# Patient Record
Sex: FEMALE | Race: WHITE | Employment: FULL TIME | URBAN - METROPOLITAN AREA
[De-identification: names, ages, dates, MRNs, and addresses within clinical notes are randomized per-mention and may not be internally consistent; named-entity substitution may affect disease eponyms.]

---

## 2017-01-12 RX ORDER — FLUOXETINE 10 MG/1
CAPSULE ORAL
Qty: 30 CAPSULE | Refills: 5 | Status: SHIPPED | OUTPATIENT
Start: 2017-01-12 | End: 2017-06-26

## 2017-01-12 NOTE — TELEPHONE ENCOUNTER
for Psychiatric Non-Scheduled (Anti-Anxiety    Refill Protocol Appointment Criteria  · Appointment scheduled in the past 6 months or in the next 3 months  Recent Visits       Provider Department Primary Dx    1 month ago MD Jonn Barboza

## 2017-06-26 RX ORDER — FLUOXETINE 10 MG/1
CAPSULE ORAL
Qty: 30 CAPSULE | Refills: 5 | Status: SHIPPED | OUTPATIENT
Start: 2017-06-26 | End: 2017-12-28

## 2017-06-26 NOTE — TELEPHONE ENCOUNTER
From: Gael Bee<BR />To: Anna Mathew MD<BR />Sent: 6/22/2017  2:18 PM CDT<BR />Subject: Medication Renewal Request<BR /><BR Adriana Swift authorizing provider: Pete Treviño MD<BR /><BR Jean Basurto would like a refill of th

## 2017-07-11 ENCOUNTER — TELEPHONE (OUTPATIENT)
Dept: INTERNAL MEDICINE CLINIC | Facility: CLINIC | Age: 52
End: 2017-07-11

## 2017-07-11 DIAGNOSIS — Z12.31 VISIT FOR SCREENING MAMMOGRAM: Primary | ICD-10-CM

## 2017-07-12 NOTE — TELEPHONE ENCOUNTER
Left message for patient to call back. Please advise her that her mammogram order is in place. She can call [phone] to make a appointment.

## 2017-08-09 ENCOUNTER — HOSPITAL ENCOUNTER (OUTPATIENT)
Dept: MAMMOGRAPHY | Facility: HOSPITAL | Age: 52
Discharge: HOME | End: 2017-08-09
Attending: INTERNAL MEDICINE
Payer: COMMERCIAL

## 2017-08-09 DIAGNOSIS — Z12.31 VISIT FOR SCREENING MAMMOGRAM: ICD-10-CM

## 2017-08-09 PROCEDURE — 77067 SCR MAMMO BI INCL CAD: CPT | Performed by: INTERNAL MEDICINE

## 2017-08-10 NOTE — PROGRESS NOTES
Mammogram results are normal. Please repeat mammogram in 1 year.  (Nurses please update health maintenance report)

## 2017-11-09 ENCOUNTER — MED REC SCAN ONLY (OUTPATIENT)
Dept: INTERNAL MEDICINE CLINIC | Facility: CLINIC | Age: 52
End: 2017-11-09

## 2017-12-28 ENCOUNTER — OFFICE VISIT (OUTPATIENT)
Dept: INTERNAL MEDICINE CLINIC | Facility: CLINIC | Age: 52
End: 2017-12-28

## 2017-12-28 VITALS
BODY MASS INDEX: 22.5 KG/M2 | RESPIRATION RATE: 16 BRPM | WEIGHT: 127 LBS | HEART RATE: 68 BPM | SYSTOLIC BLOOD PRESSURE: 121 MMHG | HEIGHT: 63 IN | DIASTOLIC BLOOD PRESSURE: 60 MMHG

## 2017-12-28 DIAGNOSIS — Z82.49 FH: CAD (CORONARY ARTERY DISEASE): ICD-10-CM

## 2017-12-28 DIAGNOSIS — F32.A DEPRESSIVE DISORDER: Primary | ICD-10-CM

## 2017-12-28 PROCEDURE — 99214 OFFICE O/P EST MOD 30 MIN: CPT | Performed by: INTERNAL MEDICINE

## 2017-12-28 PROCEDURE — 99212 OFFICE O/P EST SF 10 MIN: CPT | Performed by: INTERNAL MEDICINE

## 2017-12-28 RX ORDER — FLUOXETINE 10 MG/1
CAPSULE ORAL
Qty: 30 CAPSULE | Refills: 5 | Status: SHIPPED | OUTPATIENT
Start: 2017-12-28 | End: 2017-12-30

## 2017-12-28 RX ORDER — VALACYCLOVIR HYDROCHLORIDE 1 G/1
TABLET, FILM COATED ORAL AS NEEDED
Refills: 0 | COMMUNITY
Start: 2017-12-02 | End: 2018-11-21

## 2017-12-28 NOTE — PROGRESS NOTES
Jazmin Araujo is a 47year old female. HPI:   1. Left chest pain    Had been having some left sided chest pain that can hurt for a whole day. The pain has since receded and she is feeling better.      2. Diverticulitis    Currently not with symptoms adult)   Pulse 68   Resp 16   Ht 5' 3\" (1.6 m)   Wt 127 lb (57.6 kg)   BMI 22.50 kg/m²      GENERAL: well developed, well nourished,in no apparent distress  SKIN: no rashes,no suspicious lesions  HEENT: atraumatic, normocephalic,ears and throat are clear

## 2018-01-03 RX ORDER — FLUOXETINE 10 MG/1
CAPSULE ORAL
Qty: 30 CAPSULE | Refills: 4 | Status: SHIPPED | OUTPATIENT
Start: 2018-01-03 | End: 2018-11-21

## 2018-06-05 ENCOUNTER — OFFICE VISIT (OUTPATIENT)
Dept: INTERNAL MEDICINE CLINIC | Facility: CLINIC | Age: 53
End: 2018-06-05

## 2018-06-05 VITALS
RESPIRATION RATE: 16 BRPM | DIASTOLIC BLOOD PRESSURE: 69 MMHG | WEIGHT: 128 LBS | HEART RATE: 73 BPM | HEIGHT: 63 IN | BODY MASS INDEX: 22.68 KG/M2 | SYSTOLIC BLOOD PRESSURE: 100 MMHG

## 2018-06-05 DIAGNOSIS — R76.8 POSITIVE ANA (ANTINUCLEAR ANTIBODY): ICD-10-CM

## 2018-06-05 DIAGNOSIS — F32.A DEPRESSIVE DISORDER: Primary | ICD-10-CM

## 2018-06-05 PROCEDURE — 99212 OFFICE O/P EST SF 10 MIN: CPT | Performed by: INTERNAL MEDICINE

## 2018-06-05 PROCEDURE — 99214 OFFICE O/P EST MOD 30 MIN: CPT | Performed by: INTERNAL MEDICINE

## 2018-06-05 NOTE — PROGRESS NOTES
Lonnie Murcia is a 47year old female. HPI:     1. Depressive disorder    Patient has been taking anti-depressants as prescribed. Sleep has been worse with some menopausal symoptoms and mood is stable.  Anxiety has been better controlled on the curre throat are clear  NECK: supple,no adenopathy,no bruits  LUNGS: clear to auscultation  ABD: no tenderness noted  CARDIO: RRR without murmur  GI: good BS's,no masses, HSM, has some discomfort in LLQ without guarding or rebound.   EXTREMITIES: no cyanosis, clu

## 2018-06-25 ENCOUNTER — OFFICE VISIT (OUTPATIENT)
Dept: RHEUMATOLOGY | Facility: CLINIC | Age: 53
End: 2018-06-25

## 2018-06-25 ENCOUNTER — LAB ENCOUNTER (OUTPATIENT)
Dept: LAB | Facility: HOSPITAL | Age: 53
End: 2018-06-25
Attending: INTERNAL MEDICINE
Payer: COMMERCIAL

## 2018-06-25 VITALS
SYSTOLIC BLOOD PRESSURE: 101 MMHG | WEIGHT: 127 LBS | HEART RATE: 68 BPM | DIASTOLIC BLOOD PRESSURE: 68 MMHG | HEIGHT: 63 IN | BODY MASS INDEX: 22.5 KG/M2

## 2018-06-25 DIAGNOSIS — R76.8 POSITIVE ANA (ANTINUCLEAR ANTIBODY): ICD-10-CM

## 2018-06-25 DIAGNOSIS — R76.8 POSITIVE ANA (ANTINUCLEAR ANTIBODY): Primary | ICD-10-CM

## 2018-06-25 PROCEDURE — 86039 ANTINUCLEAR ANTIBODIES (ANA): CPT

## 2018-06-25 PROCEDURE — 36415 COLL VENOUS BLD VENIPUNCTURE: CPT

## 2018-06-25 PROCEDURE — 86160 COMPLEMENT ANTIGEN: CPT

## 2018-06-25 PROCEDURE — 86225 DNA ANTIBODY NATIVE: CPT

## 2018-06-25 PROCEDURE — 86235 NUCLEAR ANTIGEN ANTIBODY: CPT

## 2018-06-25 PROCEDURE — 99244 OFF/OP CNSLTJ NEW/EST MOD 40: CPT | Performed by: INTERNAL MEDICINE

## 2018-06-25 PROCEDURE — 99212 OFFICE O/P EST SF 10 MIN: CPT | Performed by: INTERNAL MEDICINE

## 2018-06-25 PROCEDURE — 86038 ANTINUCLEAR ANTIBODIES: CPT

## 2018-06-25 NOTE — PROGRESS NOTES
Dear Zella Kehr:    I saw your patient Laura Quigley in consultation this afternoon at your request for evaluation of positive KERVIN.   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 Occasional alcohol. Not more than 2 cups of coffee a day. She exercises a lot, which is not limited. Review of systems:  No fevers, chills, sweats, anorexia, weight loss.   Her skin is sensitive to the sun and she has broken out into a claudia replacement pellets 2-3 times a year. 3.  Gluten sensitivity, on gluten-free diet. 4.  History of diverticulitis. Thank you for inviting me to participate in her care. I will see her back when and if needed.       Sincerely,      Ashvin Slight

## 2018-09-11 ENCOUNTER — TELEPHONE (OUTPATIENT)
Dept: INTERNAL MEDICINE CLINIC | Facility: CLINIC | Age: 53
End: 2018-09-11

## 2018-09-11 DIAGNOSIS — Z12.39 SCREENING FOR BREAST CANCER: Primary | ICD-10-CM

## 2018-09-24 ENCOUNTER — TELEPHONE (OUTPATIENT)
Dept: RHEUMATOLOGY | Facility: CLINIC | Age: 53
End: 2018-09-24

## 2018-09-24 NOTE — TELEPHONE ENCOUNTER
Pt called in stating that she just saw the message from Dr. Gwen Marc and would like to try an move her appt sooner (currently for 11/5) to discuss the lab results.  She states that she will be off Oct 4th or anything after 3pm would be preferred (due to her work

## 2018-09-24 NOTE — TELEPHONE ENCOUNTER
Contacted pt to reschedule a sooner appt per request. Follow-up appt scheduled:    Future Appointments   Date Time Provider Pb De [place]   10/16/2018  4:30 PM Paulino Lechuga MD [facility] [place]

## 2018-10-06 ENCOUNTER — HOSPITAL ENCOUNTER (OUTPATIENT)
Dept: MAMMOGRAPHY | Facility: HOSPITAL | Age: 53
Discharge: HOME | End: 2018-10-06
Attending: INTERNAL MEDICINE
Payer: COMMERCIAL

## 2018-10-06 DIAGNOSIS — Z12.39 SCREENING FOR BREAST CANCER: ICD-10-CM

## 2018-10-06 PROCEDURE — 77063 BREAST TOMOSYNTHESIS BI: CPT | Performed by: INTERNAL MEDICINE

## 2018-10-06 PROCEDURE — 77067 SCR MAMMO BI INCL CAD: CPT | Performed by: INTERNAL MEDICINE

## 2018-10-16 ENCOUNTER — OFFICE VISIT (OUTPATIENT)
Dept: RHEUMATOLOGY | Facility: CLINIC | Age: 53
End: 2018-10-16
Payer: COMMERCIAL

## 2018-10-16 VITALS
BODY MASS INDEX: 23.21 KG/M2 | HEART RATE: 65 BPM | HEIGHT: 63 IN | DIASTOLIC BLOOD PRESSURE: 71 MMHG | WEIGHT: 131 LBS | SYSTOLIC BLOOD PRESSURE: 108 MMHG

## 2018-10-16 DIAGNOSIS — R76.8 POSITIVE ANA (ANTINUCLEAR ANTIBODY): Primary | ICD-10-CM

## 2018-10-16 PROCEDURE — 99213 OFFICE O/P EST LOW 20 MIN: CPT | Performed by: INTERNAL MEDICINE

## 2018-10-16 PROCEDURE — 99212 OFFICE O/P EST SF 10 MIN: CPT | Performed by: INTERNAL MEDICINE

## 2018-10-16 NOTE — PROGRESS NOTES
Dear Sherri Martinez:    I saw your patient Flor Mcgowan in follow-up this afternoon in my rheumatology clinic. I saw her in consultation on June 25th, 2018, for a positive KERVIN. She did not have good symptoms of autoimmune disorder.     Her SSA came back st May of 2018. I gave her information on Sjogren's syndrome and we had a long discussion. I will see her back in early June of 2019.      2.  History of diverticulitis. 3.  Gluten sensitivity, on gluten-free diet.       Thanks again for the opportunity

## 2018-11-19 ENCOUNTER — OFFICE VISIT (OUTPATIENT)
Dept: SURGERY | Facility: CLINIC | Age: 53
End: 2018-11-19
Payer: COMMERCIAL

## 2018-11-19 DIAGNOSIS — C44.329 SQUAMOUS CELL CARCINOMA OF SKIN OF OTHER PARTS OF FACE: Primary | ICD-10-CM

## 2018-11-19 PROCEDURE — 99212 OFFICE O/P EST SF 10 MIN: CPT | Performed by: PLASTIC SURGERY

## 2018-11-19 PROCEDURE — 99243 OFF/OP CNSLTJ NEW/EST LOW 30: CPT | Performed by: PLASTIC SURGERY

## 2018-11-19 NOTE — H&P (VIEW-ONLY)
Flor Mcgowan is a 47year old female that presents with Patient presents with:  Lesion: SCC L CHEEK  .     REFERRED BY:  Judge Bernard    Pacemaker: No  Latex Allergy: no  Coumadin: No  Plavix: No  Other anticoagulants: No  Cardiac stents: No    HAND DO Oral Cap Take 200 mg by mouth daily. Disp:  Rfl: 0   ValACYclovir HCl 1 G Oral Tab as needed.    Disp:  Rfl: 0       Allergies:     Sulfa Antibiotics       RASH  Iodine [Radiology C*    RASH  Gluten Flour            RASH    Comment:Digestive,aches and claudia Concern: Not Asked        Special Diet: Not Asked        Back Care: Not Asked        Exercise: Not Asked        Bike Helmet: Not Asked        Seat Belt: Not Asked        Self-Exams: Not Asked        Left Handed: No        Right Handed: Yes        Currently the lesion, as we need to excise margins around the tumor. Further excision may be necessary, if tumor is present microscopically on the pathology report. This may result in a longer scar. Recurrence may occur, which will require further surgery.

## 2018-11-19 NOTE — H&P
Allen Reynaga is a 47year old female that presents with Patient presents with:  Lesion: SCC L CHEEK  .     REFERRED BY:  Ronda Stock    Pacemaker: No  Latex Allergy: no  Coumadin: No  Plavix: No  Other anticoagulants: No  Cardiac stents: No    HAND DO Oral Cap Take 200 mg by mouth daily. Disp:  Rfl: 0   ValACYclovir HCl 1 G Oral Tab as needed.    Disp:  Rfl: 0       Allergies:     Sulfa Antibiotics       RASH  Iodine [Radiology C*    RASH  Gluten Flour            RASH    Comment:Digestive,aches and claudia Concern: Not Asked        Special Diet: Not Asked        Back Care: Not Asked        Exercise: Not Asked        Bike Helmet: Not Asked        Seat Belt: Not Asked        Self-Exams: Not Asked        Left Handed: No        Right Handed: Yes        Currently the lesion, as we need to excise margins around the tumor. Further excision may be necessary, if tumor is present microscopically on the pathology report. This may result in a longer scar. Recurrence may occur, which will require further surgery.

## 2018-11-20 ENCOUNTER — TELEPHONE (OUTPATIENT)
Dept: SURGERY | Facility: CLINIC | Age: 53
End: 2018-11-20

## 2018-11-20 DIAGNOSIS — C44.329 SQUAMOUS CELL CARCINOMA OF SKIN OF OTHER PARTS OF FACE: Primary | ICD-10-CM

## 2018-11-21 ENCOUNTER — OFFICE VISIT (OUTPATIENT)
Dept: INTERNAL MEDICINE CLINIC | Facility: CLINIC | Age: 53
End: 2018-11-21
Payer: COMMERCIAL

## 2018-11-21 VITALS
HEART RATE: 65 BPM | HEIGHT: 63 IN | BODY MASS INDEX: 23.04 KG/M2 | SYSTOLIC BLOOD PRESSURE: 118 MMHG | RESPIRATION RATE: 16 BRPM | WEIGHT: 130 LBS | DIASTOLIC BLOOD PRESSURE: 78 MMHG

## 2018-11-21 DIAGNOSIS — R07.9 LEFT SIDED CHEST PAIN: Primary | ICD-10-CM

## 2018-11-21 DIAGNOSIS — R05.9 COUGH: ICD-10-CM

## 2018-11-21 DIAGNOSIS — F32.A DEPRESSIVE DISORDER: ICD-10-CM

## 2018-11-21 PROCEDURE — 99214 OFFICE O/P EST MOD 30 MIN: CPT | Performed by: INTERNAL MEDICINE

## 2018-11-21 PROCEDURE — 99212 OFFICE O/P EST SF 10 MIN: CPT | Performed by: INTERNAL MEDICINE

## 2018-11-21 RX ORDER — VALACYCLOVIR HYDROCHLORIDE 1 G/1
1 TABLET, FILM COATED ORAL AS NEEDED
Qty: 21 TABLET | Refills: 1 | Status: SHIPPED | OUTPATIENT
Start: 2018-11-21 | End: 2020-08-20

## 2018-11-21 RX ORDER — FLUOXETINE 10 MG/1
10 CAPSULE ORAL DAILY
Qty: 30 CAPSULE | Refills: 11 | Status: SHIPPED | OUTPATIENT
Start: 2018-11-21 | End: 2019-11-30

## 2018-11-21 NOTE — PROGRESS NOTES
Carlito Burrell is a 47year old female. HPI:   1. Left chest pain    Had been having some left sided chest pain that can hurt for a whole day. The pain has since receded and she is feeling better.  Has had this chest pain before and had father with an exertion  GI: denies abdominal pain and denies heartburn  NEURO: denies headaches    EXAM:   /78   Pulse 65   Resp 16   Ht 5' 3\" (1.6 m)   Wt 130 lb (59 kg)   BMI 23.03 kg/m²      GENERAL: well developed, well nourished,in no apparent distress  SKIN

## 2018-11-26 NOTE — TELEPHONE ENCOUNTER
Spoke to [employer] (call ref# [id number])    Effective date: 1/1/17    Individual Deduct: $ 2,700.00 with $ 1,913.45 met  Family Deduct: $ 5,400.00 with $ 5,400.00 met  Individual OOP: $ 2,700.00 with $ 1,913.45 met  Family OOP:$ 5,400.00 with $ 5,4

## 2018-11-29 ENCOUNTER — MED REC SCAN ONLY (OUTPATIENT)
Dept: INTERNAL MEDICINE CLINIC | Facility: CLINIC | Age: 53
End: 2018-11-29

## 2018-11-30 ENCOUNTER — HOSPITAL ENCOUNTER (OUTPATIENT)
Facility: HOSPITAL | Age: 53
Setting detail: HOSPITAL OUTPATIENT SURGERY
Discharge: HOME | End: 2018-11-30
Attending: PLASTIC SURGERY | Admitting: PLASTIC SURGERY
Payer: COMMERCIAL

## 2018-11-30 ENCOUNTER — HOSPITAL DOCUMENTATION (OUTPATIENT)
Dept: SURGERY | Facility: CLINIC | Age: 53
End: 2018-11-30

## 2018-11-30 VITALS
HEART RATE: 82 BPM | SYSTOLIC BLOOD PRESSURE: 119 MMHG | OXYGEN SATURATION: 100 % | DIASTOLIC BLOOD PRESSURE: 75 MMHG | BODY MASS INDEX: 22.86 KG/M2 | HEIGHT: 63 IN | RESPIRATION RATE: 16 BRPM | WEIGHT: 129 LBS

## 2018-11-30 DIAGNOSIS — C44.329 SQUAMOUS CELL CARCINOMA OF SKIN OF OTHER PARTS OF FACE: Primary | ICD-10-CM

## 2018-11-30 PROCEDURE — 0HX1XZZ TRANSFER FACE SKIN, EXTERNAL APPROACH: ICD-10-PCS | Performed by: PLASTIC SURGERY

## 2018-11-30 PROCEDURE — 14041 TIS TRNFR F/C/C/M/N/A/G/H/F: CPT | Performed by: PLASTIC SURGERY

## 2018-11-30 PROCEDURE — 0HB1XZZ EXCISION OF FACE SKIN, EXTERNAL APPROACH: ICD-10-PCS | Performed by: PLASTIC SURGERY

## 2018-11-30 RX ORDER — ACETAMINOPHEN 500 MG
500 TABLET ORAL EVERY 4 HOURS PRN
Status: DISCONTINUED | OUTPATIENT
Start: 2018-11-30 | End: 2018-11-30

## 2018-11-30 RX ORDER — LIDOCAINE HYDROCHLORIDE AND EPINEPHRINE 5; 5 MG/ML; UG/ML
INJECTION, SOLUTION INFILTRATION; PERINEURAL AS NEEDED
Status: DISCONTINUED | OUTPATIENT
Start: 2018-11-30 | End: 2018-11-30 | Stop reason: HOSPADM

## 2018-11-30 NOTE — INTERVAL H&P NOTE
Pre-op Diagnosis: Lesion    The above referenced H&P was reviewed by Margret Acosta MD on 11/30/2018, the patient was examined and no significant changes have occurred in the patient's condition since the H&P was performed.   I discussed with the pa

## 2018-11-30 NOTE — BRIEF OP NOTE
Pre-Operative Diagnosis: Lesion     Post-Operative Diagnosis: Lesion      Procedure Performed:   Procedure(s):   Wide excision lesion(s) left cheek, frozen section    Surgeon(s) and Role:     * Praful yT MD - Primary    Assistant(s):        Cesar

## 2018-11-30 NOTE — OPERATIVE REPORT
[facility]    PATIENT'S NAME: Anushka Shauna   ATTENDING PHYSICIAN: Margaret Evans MD   OPERATING PHYSICIAN: Margaret Evans MD   PATIENT ACCOUNT#:   [de-identified]    LOCATION:  [place] [address]  MEDICAL RECORD #:   [id number] 10:10:10  Job [id number]  [facility]/    cc: MD Shanna Molina MD Lyndall Hipp.  Lonnie Jiménez MD

## 2018-12-02 ENCOUNTER — TELEPHONE (OUTPATIENT)
Dept: SURGERY | Facility: CLINIC | Age: 53
End: 2018-12-02

## 2018-12-02 NOTE — TELEPHONE ENCOUNTER
Spoke with pt. \"Little incisional pain\" Rates pain  4/10. Taking tylenol prn, helpful. [de-identified] DI with old blood present. Pt told she may shower.   Reminded next appointment 12/5/18 with RN  Instructed to call the office with any questions and/or

## 2018-12-05 ENCOUNTER — NURSE ONLY (OUTPATIENT)
Dept: SURGERY | Facility: CLINIC | Age: 53
End: 2018-12-05
Payer: COMMERCIAL

## 2018-12-05 DIAGNOSIS — C44.329 SQUAMOUS CELL CARCINOMA OF SKIN OF OTHER PARTS OF FACE: ICD-10-CM

## 2018-12-05 DIAGNOSIS — Z48.02 ENCOUNTER FOR REMOVAL OF SUTURES: Primary | ICD-10-CM

## 2018-12-05 PROCEDURE — 99212 OFFICE O/P EST SF 10 MIN: CPT | Performed by: PLASTIC SURGERY

## 2018-12-05 NOTE — PROGRESS NOTES
Surgery 1: SCC L chek  - Date: 11/30/18  - Days Since: 5    Pt here for suture removal to Left cheek . Pt identified w/2 identifiers and orders verified. Pt presents w/steri-strip C/D/I. Pt denies c/o pain, use of analgesics, and s/s infection.   Steri-s

## 2019-07-10 ENCOUNTER — OFFICE VISIT (OUTPATIENT)
Dept: INTERNAL MEDICINE CLINIC | Facility: CLINIC | Age: 54
End: 2019-07-10
Payer: COMMERCIAL

## 2019-07-10 VITALS
BODY MASS INDEX: 22.68 KG/M2 | HEART RATE: 76 BPM | RESPIRATION RATE: 16 BRPM | HEIGHT: 63 IN | SYSTOLIC BLOOD PRESSURE: 105 MMHG | DIASTOLIC BLOOD PRESSURE: 69 MMHG | WEIGHT: 128 LBS

## 2019-07-10 DIAGNOSIS — R10.32 ACUTE LEFT LOWER QUADRANT PAIN: Primary | ICD-10-CM

## 2019-07-10 PROCEDURE — 99213 OFFICE O/P EST LOW 20 MIN: CPT | Performed by: PHYSICIAN ASSISTANT

## 2019-07-10 RX ORDER — AMOXICILLIN AND CLAVULANATE POTASSIUM 875; 125 MG/1; MG/1
1 TABLET, FILM COATED ORAL 2 TIMES DAILY
Qty: 20 TABLET | Refills: 0 | Status: SHIPPED | OUTPATIENT
Start: 2019-07-10 | End: 2019-07-20

## 2019-07-10 NOTE — PROGRESS NOTES
HPI:    Patient ID: Newton Fontana is a 47year old female. HPI   Patient presents complaining of left lower quadrant pain for the past 2 weeks. States it started as being intermittent but now is constant.   She states the pain is tolerable however skin of left cheek 11/19/2018    WIde excision lesion to left cheek and frozen section     Social History    Tobacco Use      Smoking status: Never Smoker      Smokeless tobacco: Never Used    Alcohol use:  Yes      Alcohol/week: 0.0 oz      Comment: Tricia Figueroa quadrant. Lymphadenopathy:     She has no cervical adenopathy. Neurological: She is alert and oriented to person, place, and time. Skin: Skin is warm and dry. Psychiatric: She has a normal mood and affect.  Her behavior is normal. Judgment and thoug

## 2019-07-16 ENCOUNTER — TELEPHONE (OUTPATIENT)
Dept: GASTROENTEROLOGY | Facility: CLINIC | Age: 54
End: 2019-07-16

## 2019-07-16 NOTE — TELEPHONE ENCOUNTER
----- Message from Marjorie Art RN sent at 8/4/2015  4:11 PM CDT -----  Regarding: Recall Colon  Recall colon in 5 years per cb.  Colon done 8/14/14

## 2019-08-03 ENCOUNTER — HOSPITAL ENCOUNTER (OUTPATIENT)
Age: 54
Discharge: HOME | End: 2019-08-03
Attending: EMERGENCY MEDICINE
Payer: COMMERCIAL

## 2019-08-03 VITALS
TEMPERATURE: 98 F | SYSTOLIC BLOOD PRESSURE: 110 MMHG | HEART RATE: 58 BPM | WEIGHT: 125 LBS | HEIGHT: 63 IN | BODY MASS INDEX: 22.15 KG/M2 | OXYGEN SATURATION: 100 % | DIASTOLIC BLOOD PRESSURE: 75 MMHG | RESPIRATION RATE: 16 BRPM

## 2019-08-03 DIAGNOSIS — H81.10 BENIGN PAROXYSMAL POSITIONAL VERTIGO, UNSPECIFIED LATERALITY: Primary | ICD-10-CM

## 2019-08-03 PROCEDURE — 99214 OFFICE O/P EST MOD 30 MIN: CPT

## 2019-08-03 PROCEDURE — 99213 OFFICE O/P EST LOW 20 MIN: CPT

## 2019-08-03 RX ORDER — MECLIZINE HYDROCHLORIDE 25 MG/1
25 TABLET ORAL 3 TIMES DAILY PRN
Qty: 15 TABLET | Refills: 0 | Status: SHIPPED | OUTPATIENT
Start: 2019-08-03 | End: 2019-08-08

## 2019-08-03 NOTE — ED PROVIDER NOTES
Patient Seen in: [facility] CLINICS Immediate Care In [address] [place] [address]    History   Patient presents with:  Dizziness (neurologic)    Stated Complaint: Dizziness/Diarrhea/Nausea    HPI  Patient noted yesterday dizziness when she stood up after putting her dogs. reviewed and is as noted in HPI. Social History    Tobacco Use      Smoking status: Never Smoker      Smokeless tobacco: Never Used    Alcohol use:  Yes      Alcohol/week: 0.0 standard drinks      Comment: Weekends; wine, 3 glasses, weekly    Drug use: N a negative Romberg sign. Coordination and gait normal. GCS eye subscore is 4. GCS verbal subscore is 5. GCS motor subscore is 6. She displays no Babinski's sign on the right side. She displays no Babinski's sign on the left side.    Skin: Skin is warm and d

## 2019-08-03 NOTE — ED INITIAL ASSESSMENT (HPI)
Pt complaining of dizziness since yesterday. No headache or blurred vision. +nausea/-vomiting. Pt states she feels off balance. Pt denies any light sensitivity.

## 2019-09-18 ENCOUNTER — OFFICE VISIT (OUTPATIENT)
Dept: INTERNAL MEDICINE CLINIC | Facility: CLINIC | Age: 54
End: 2019-09-18
Payer: COMMERCIAL

## 2019-09-18 VITALS
WEIGHT: 125 LBS | BODY MASS INDEX: 22.15 KG/M2 | DIASTOLIC BLOOD PRESSURE: 69 MMHG | RESPIRATION RATE: 16 BRPM | HEART RATE: 58 BPM | SYSTOLIC BLOOD PRESSURE: 103 MMHG | HEIGHT: 63 IN

## 2019-09-18 DIAGNOSIS — Z00.00 ROUTINE HISTORY AND PHYSICAL EXAMINATION OF ADULT: Primary | ICD-10-CM

## 2019-09-18 DIAGNOSIS — Z12.31 VISIT FOR SCREENING MAMMOGRAM: ICD-10-CM

## 2019-09-18 PROCEDURE — 99396 PREV VISIT EST AGE 40-64: CPT | Performed by: INTERNAL MEDICINE

## 2019-09-18 RX ORDER — MECLIZINE HYDROCHLORIDE 25 MG/1
25 TABLET ORAL 3 TIMES DAILY PRN
COMMUNITY
End: 2021-05-12

## 2019-09-19 NOTE — PROGRESS NOTES
Dianna Avendaño is a 49year old female who presents for a complete physical exam. Symptoms: is menopausal. Patient complains of vertigo. There is no immunization history on file for this patient.   Wt Readings from Last 6 Encounters:  09/18/19 : 1 Infertility    • Mild depression ([facility] [address]     Anti-depressant   • Squamous cell cancer of skin of left cheek 11/19/2018    WIde excision lesion to left cheek and frozen section      Past Surgical History:   Procedure Laterality Date   • ADJ TISS XFER HEAD,FAC adult)   Pulse 58   Resp 16   Ht 5' 3\" (1.6 m)   Wt 125 lb (56.7 kg)   BMI 22.14 kg/m²   Body mass index is 22.14 kg/m².      GENERAL: well developed, well nourished,in no apparent distress  SKIN: no rashes,no suspicious lesions  HEENT: atraumatic, normoce

## 2019-11-11 ENCOUNTER — TELEPHONE (OUTPATIENT)
Dept: GASTROENTEROLOGY | Facility: CLINIC | Age: 54
End: 2019-11-11

## 2019-11-11 DIAGNOSIS — Z12.11 COLON CANCER SCREENING: Primary | ICD-10-CM

## 2019-11-11 NOTE — TELEPHONE ENCOUNTER
Pt received letter in mail for 5 yr repeat colonoscopy. 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 Can she just schedule procedure?   Please call after 3pm.

## 2019-11-12 NOTE — TELEPHONE ENCOUNTER
FINAL DIAGNOSIS                         Ileum; biopsy:     *  Fragments of small intestinal/ileal mucosa   demonstrating unremarkable features including preservation   of the villous and glandular architecture, diffuse mild   vascular congestion conscious sedation were discussed with Ms. Elianabg Rothman and her questions were answered, her informed consent was obtained. She was sedated as above. Digital rectal exam was performed which showed no masses.   The Olympus pediatric washing video colonoscope w by:  Cortney Contreras  cc: Edwar Isidro([employer])    TID: [id number]  DD: 08/14/2014 8:17:08 AM  DT: 08/15/2014 4:00:14 AM  [id number]/

## 2019-11-12 NOTE — TELEPHONE ENCOUNTER
Routed to [facility] for orders

## 2019-11-12 NOTE — TELEPHONE ENCOUNTER
Last Procedure, Date, MD:  08/14/14  Last Diagnosis:  See op report/pathology  Recalled for (months or years): 5 years  Sedation used previously:  IV  Last Prep Used (if known):    Quality of prep (if known): good  Anticoagulants:no  Diabetic Meds: no  BP

## 2019-11-13 NOTE — TELEPHONE ENCOUNTER
Scheduled for:  Colonoscopy [address]  Provider Name: Dr. Marii Liu  Date:  1/14/20  Location:  [facility]  Sedation:  MAC  Time:   0830 (pt is aware to arrive at 0730)   Prep:  Suprep, sent via [employer]/Allergies Reconciled?:  Physician reviewed   Diagnosis with c

## 2019-11-13 NOTE — TELEPHONE ENCOUNTER
Thanks all. Recent office visit with Dr. Mayda Starr of 9/18/2019 reviewed.   Previous colonoscopy report reviewed.    ====    Schedule colonoscopy exam at [facility])/ [facility]    This patient IS appropriate for the [facility]

## 2019-12-02 RX ORDER — FLUOXETINE 10 MG/1
CAPSULE ORAL
Qty: 90 CAPSULE | Refills: 1 | Status: SHIPPED | OUTPATIENT
Start: 2019-12-02 | End: 2020-06-01

## 2019-12-02 NOTE — TELEPHONE ENCOUNTER
Pt states she is taking 10mg but won't mind increasing to 25mg. Informed pt the med listed 10mg. Pt agreed to take this dose. She is out of it.      Dr Jacey Goncalves, medication pended for review/approval.

## 2019-12-02 NOTE — TELEPHONE ENCOUNTER
Patient calling upset because she needs a refill on the fluoxetine. Patient states she is taking 10 mg once daily. Please review and sign pending order.

## 2019-12-02 NOTE — TELEPHONE ENCOUNTER
Please follow up with patient to get more info on dosage:    FLUoxetine HCl 10 MG Oral Cap  11/21/18  --    Provider: Ian Hernandez MD    Take 1 capsule (10 mg total) by mouth daily.     Patient taking differently: Take 25 mg by mouth daily.

## 2019-12-03 NOTE — TELEPHONE ENCOUNTER
Spoke with [facility] and they received the rx for Fluoxetine and have filled the medication. Pharmacy will call patient to  the medication.

## 2019-12-28 ENCOUNTER — HOSPITAL ENCOUNTER (OUTPATIENT)
Dept: MAMMOGRAPHY | Facility: HOSPITAL | Age: 54
Discharge: HOME | End: 2019-12-28
Attending: INTERNAL MEDICINE
Payer: COMMERCIAL

## 2019-12-28 DIAGNOSIS — Z12.31 VISIT FOR SCREENING MAMMOGRAM: ICD-10-CM

## 2019-12-28 PROCEDURE — 77067 SCR MAMMO BI INCL CAD: CPT | Performed by: INTERNAL MEDICINE

## 2019-12-28 PROCEDURE — 77063 BREAST TOMOSYNTHESIS BI: CPT | Performed by: INTERNAL MEDICINE

## 2020-01-06 ENCOUNTER — TELEPHONE (OUTPATIENT)
Dept: GASTROENTEROLOGY | Facility: CLINIC | Age: 55
End: 2020-01-06

## 2020-01-06 NOTE — TELEPHONE ENCOUNTER
Continue off work until next visit   Pt states that RX for prep was going to be $98.  Is there something less expensive? Please call.

## 2020-01-14 ENCOUNTER — ANESTHESIA (OUTPATIENT)
Dept: ENDOSCOPY | Facility: HOSPITAL | Age: 55
End: 2020-01-14
Payer: COMMERCIAL

## 2020-01-14 ENCOUNTER — HOSPITAL ENCOUNTER (OUTPATIENT)
Facility: HOSPITAL | Age: 55
Setting detail: HOSPITAL OUTPATIENT SURGERY
Discharge: HOME | End: 2020-01-14
Attending: INTERNAL MEDICINE | Admitting: INTERNAL MEDICINE
Payer: COMMERCIAL

## 2020-01-14 ENCOUNTER — ANESTHESIA EVENT (OUTPATIENT)
Dept: ENDOSCOPY | Facility: HOSPITAL | Age: 55
End: 2020-01-14
Payer: COMMERCIAL

## 2020-01-14 VITALS
WEIGHT: 129 LBS | HEIGHT: 63 IN | DIASTOLIC BLOOD PRESSURE: 75 MMHG | BODY MASS INDEX: 22.86 KG/M2 | RESPIRATION RATE: 16 BRPM | OXYGEN SATURATION: 98 % | SYSTOLIC BLOOD PRESSURE: 105 MMHG | HEART RATE: 57 BPM

## 2020-01-14 DIAGNOSIS — Z12.11 COLON CANCER SCREENING: ICD-10-CM

## 2020-01-14 DIAGNOSIS — K64.9 HEMORRHOIDS: ICD-10-CM

## 2020-01-14 DIAGNOSIS — K57.90 DIVERTICULOSIS: Primary | ICD-10-CM

## 2020-01-14 PROCEDURE — 0DJD8ZZ INSPECTION OF LOWER INTESTINAL TRACT, VIA NATURAL OR ARTIFICIAL OPENING ENDOSCOPIC: ICD-10-PCS | Performed by: INTERNAL MEDICINE

## 2020-01-14 PROCEDURE — 45378 DIAGNOSTIC COLONOSCOPY: CPT | Performed by: INTERNAL MEDICINE

## 2020-01-14 RX ORDER — SODIUM CHLORIDE, SODIUM LACTATE, POTASSIUM CHLORIDE, CALCIUM CHLORIDE 600; 310; 30; 20 MG/100ML; MG/100ML; MG/100ML; MG/100ML
INJECTION, SOLUTION INTRAVENOUS CONTINUOUS
Status: DISCONTINUED | OUTPATIENT
Start: 2020-01-14 | End: 2020-01-14

## 2020-01-14 RX ORDER — ONDANSETRON 2 MG/ML
4 INJECTION INTRAMUSCULAR; INTRAVENOUS ONCE AS NEEDED
Status: CANCELLED | OUTPATIENT
Start: 2020-01-14 | End: 2020-01-14

## 2020-01-14 RX ORDER — NALOXONE HYDROCHLORIDE 0.4 MG/ML
80 INJECTION, SOLUTION INTRAMUSCULAR; INTRAVENOUS; SUBCUTANEOUS AS NEEDED
Status: CANCELLED | OUTPATIENT
Start: 2020-01-14 | End: 2020-01-14

## 2020-01-14 RX ORDER — SODIUM CHLORIDE, SODIUM LACTATE, POTASSIUM CHLORIDE, CALCIUM CHLORIDE 600; 310; 30; 20 MG/100ML; MG/100ML; MG/100ML; MG/100ML
INJECTION, SOLUTION INTRAVENOUS CONTINUOUS
Status: CANCELLED | OUTPATIENT
Start: 2020-01-14

## 2020-01-14 RX ORDER — LIDOCAINE HYDROCHLORIDE 10 MG/ML
INJECTION, SOLUTION EPIDURAL; INFILTRATION; INTRACAUDAL; PERINEURAL AS NEEDED
Status: DISCONTINUED | OUTPATIENT
Start: 2020-01-14 | End: 2020-01-14 | Stop reason: SURG

## 2020-01-14 RX ADMIN — LIDOCAINE HYDROCHLORIDE 50 MG: 10 INJECTION, SOLUTION EPIDURAL; INFILTRATION; INTRACAUDAL; PERINEURAL at 09:15:00

## 2020-01-14 RX ADMIN — SODIUM CHLORIDE, SODIUM LACTATE, POTASSIUM CHLORIDE, CALCIUM CHLORIDE: 600; 310; 30; 20 INJECTION, SOLUTION INTRAVENOUS at 09:45:00

## 2020-01-14 NOTE — OPERATIVE REPORT
COLONOSCOPY PROCEDURE REPORT     DATE OF PROCEDURE:  1/14/2020     PCP: Fani Solis MD     PREOPERATIVE DIAGNOSIS: Screening colonoscopy examination     POSTOPERATIVE DIAGNOSIS:  See impression. SURGEON:  Christopher A. Sylvester Doe, M.D. Mack Goodell

## 2020-01-14 NOTE — ANESTHESIA POSTPROCEDURE EVALUATION
Patient: Jessica Bains    Procedure Summary     Date:  01/14/20 Room / Location:  [facility] ENDOSCOPY    Anesthesia Start:  8726 Anesthesia Stop:  7810    Procedure:  COLONOSCOPY (N/A ) Diagnosis:       Colon cancer screening      (DIVER

## 2020-01-14 NOTE — ANESTHESIA PREPROCEDURE EVALUATION
Anesthesia PreOp Note    HPI:     Tisha Dickinson is a 49year old female who presents for preoperative consultation requested by: Staci Steel MD    Date of Surgery: 1/14/2020    Procedure(s):  COLONOSCOPY  Indication: Colon cancer screen section   •      • D & C      s/p SAB   • EXCISION LESION HEAD/NECK/FACE Left 2018    Performed by Yamileth Garcia MD at [facility] MAIN OR   • LAPAROSCOPY PROCEDURE UNLISTED      Laparoscopic Laser x 2., Dr Renetta Velasco (Endometriosis)   • LEEP Used    Substance and Sexual Activity      Alcohol use: Yes        Comment: Weekends; wine, 3 glasses, weekly      Drug use: No      Sexual activity: Yes        Partners: Male        Birth control/protection: Vasectomy    Lifestyle      Physical activity: weight is 58.5 kg (129 lb). Her blood pressure is 111/73 and her pulse is 63. Her respiration is 17 and oxygen saturation is 100%.     01/09/20  1617 01/14/20  0746   BP:  111/73   Pulse:  63   Resp:  17   SpO2:  100%   Weight: 58.5 kg (129 lb)    Height: 1

## 2020-01-14 NOTE — H&P
History & Physical Examination    Patient Name: Laura Quigley  MRN: [id number]  CSN: [id number]  YOB: 1965    Diagnosis: Screening colonoscopy examination    Present Illness: 59-year-old woman with history of anxiety, fluoxetine therapy; UNLISTED      Laparoscopic Laser x 2., Dr Vikash Collado (Endometriosis)   • LEEP       Family History   Problem Relation Age of Onset   • Heart Disorder Father         Sudden cardiac death   • Dementia Father    • Lung Disorder Father         COPD   • Anxiety Mo

## 2020-02-08 ENCOUNTER — HOSPITAL ENCOUNTER (OUTPATIENT)
Age: 55
Discharge: HOME | End: 2020-02-08
Attending: EMERGENCY MEDICINE
Payer: COMMERCIAL

## 2020-02-08 VITALS
HEART RATE: 76 BPM | BODY MASS INDEX: 22.68 KG/M2 | DIASTOLIC BLOOD PRESSURE: 71 MMHG | HEIGHT: 63 IN | RESPIRATION RATE: 18 BRPM | WEIGHT: 128 LBS | SYSTOLIC BLOOD PRESSURE: 117 MMHG | TEMPERATURE: 97 F | OXYGEN SATURATION: 100 %

## 2020-02-08 DIAGNOSIS — L02.411 CUTANEOUS ABSCESS OF RIGHT AXILLA: Primary | ICD-10-CM

## 2020-02-08 PROCEDURE — 99213 OFFICE O/P EST LOW 20 MIN: CPT

## 2020-02-08 PROCEDURE — 99214 OFFICE O/P EST MOD 30 MIN: CPT

## 2020-02-08 RX ORDER — CLINDAMYCIN HYDROCHLORIDE 300 MG/1
300 CAPSULE ORAL 3 TIMES DAILY
Qty: 30 CAPSULE | Refills: 0 | Status: SHIPPED | OUTPATIENT
Start: 2020-02-08 | End: 2020-02-18

## 2020-02-08 NOTE — ED PROVIDER NOTES
Patient Seen in: [facility] Immediate Care In [address] [place] [address]      History   Patient presents with:  Cyst    Stated Complaint: right arm cyst     HPI    The patient is a 46-year-old female with a past history of diverticulosis who presents now with area All other systems reviewed and negative except as noted above.     Physical Exam     ED Triage Vitals [02/08/20 0807]   /71   Pulse 76   Resp 18   Temp 97.3 °F (36.3 °C)   Temp src Temporal   SpO2 100 %   O2 Device None (Room air)       Current:BP diagnosis)    Disposition:  Discharge  2/8/2020  8:23 am    Follow-up:  [address] in [address]  [phone]  In 2 days  If symptoms worsen        Medications Prescribed:  Current Discharge Med

## 2020-02-08 NOTE — ED INITIAL ASSESSMENT (HPI)
Noticed boil appearing under rt arm , doesn't want to have boil lanced because leaving for [place] was wondering if po meds only ok.

## 2020-02-08 NOTE — ED NOTES
Warm cloth to area, meds as directed keep appointment Monday, return or go to the ed for new or worse concerns

## 2020-02-10 ENCOUNTER — OFFICE VISIT (OUTPATIENT)
Dept: INTERNAL MEDICINE CLINIC | Facility: CLINIC | Age: 55
End: 2020-02-10
Payer: COMMERCIAL

## 2020-02-10 VITALS
WEIGHT: 124 LBS | HEART RATE: 66 BPM | RESPIRATION RATE: 16 BRPM | DIASTOLIC BLOOD PRESSURE: 88 MMHG | HEIGHT: 63 IN | BODY MASS INDEX: 21.97 KG/M2 | SYSTOLIC BLOOD PRESSURE: 133 MMHG

## 2020-02-10 DIAGNOSIS — L08.9 INFECTED CYST OF SKIN: Primary | ICD-10-CM

## 2020-02-10 DIAGNOSIS — L72.9 INFECTED CYST OF SKIN: Primary | ICD-10-CM

## 2020-02-10 PROCEDURE — 99213 OFFICE O/P EST LOW 20 MIN: CPT | Performed by: INTERNAL MEDICINE

## 2020-02-10 NOTE — PROGRESS NOTES
Jeffery Pereyra is a 49year old female. HPI:     1. Infected cyst of skin    Has a lipoma under left arm in armpit that has been enlarged. Over weekend became swollen and red with tenderness.  Went to immediate care where hey wanted to alanis the cyst breath with exertion  CARDIOVASCULAR: denies chest pain on exertion  GI: denies abdominal pain and denies heartburn  NEURO: denies headaches    EXAM:   /88 (BP Location: Right arm, Patient Position: Sitting, Cuff Size: adult)   Pulse 66   Resp 16   H

## 2020-05-08 ENCOUNTER — TELEMEDICINE (OUTPATIENT)
Dept: INTERNAL MEDICINE CLINIC | Facility: CLINIC | Age: 55
End: 2020-05-08

## 2020-05-08 DIAGNOSIS — R10.9 INTERMITTENT ABDOMINAL PAIN: Primary | ICD-10-CM

## 2020-05-08 DIAGNOSIS — Z87.19 HX OF DIVERTICULITIS OF COLON: ICD-10-CM

## 2020-05-08 PROCEDURE — 99213 OFFICE O/P EST LOW 20 MIN: CPT | Performed by: PHYSICIAN ASSISTANT

## 2020-05-08 RX ORDER — AMOXICILLIN AND CLAVULANATE POTASSIUM 875; 125 MG/1; MG/1
1 TABLET, FILM COATED ORAL 2 TIMES DAILY
Qty: 20 TABLET | Refills: 0 | Status: SHIPPED | OUTPATIENT
Start: 2020-05-08 | End: 2020-05-18

## 2020-05-08 NOTE — PROGRESS NOTES
This is a telemedicine visit with live, interactive video and audio. Patient understands and accepts financial responsibility for any deductible, co-insurance and/or co-pays associated with this service.     SUBJECTIVE  For the past 2 weeks ago pt has b Brother 52   • Gastro-Intestinal Disorder Brother         \"GI\"      Social History    Tobacco Use      Smoking status: Never Smoker      Smokeless tobacco: Never Used    Alcohol use: Yes      Comment: Weekends; wine, 3 glasses, weekly    Drug use:  No

## 2020-06-01 RX ORDER — FLUOXETINE 10 MG/1
CAPSULE ORAL
Qty: 90 CAPSULE | Refills: 0 | Status: SHIPPED | OUTPATIENT
Start: 2020-06-01 | End: 2020-08-24

## 2020-06-05 ENCOUNTER — MED REC SCAN ONLY (OUTPATIENT)
Dept: INTERNAL MEDICINE CLINIC | Facility: CLINIC | Age: 55
End: 2020-06-05

## 2020-07-19 ENCOUNTER — HOSPITAL ENCOUNTER (EMERGENCY)
Facility: HOSPITAL | Age: 55
Discharge: HOME | End: 2020-07-19
Attending: EMERGENCY MEDICINE
Payer: COMMERCIAL

## 2020-07-19 VITALS
BODY MASS INDEX: 22.15 KG/M2 | TEMPERATURE: 98 F | HEART RATE: 75 BPM | HEIGHT: 63 IN | SYSTOLIC BLOOD PRESSURE: 122 MMHG | DIASTOLIC BLOOD PRESSURE: 71 MMHG | RESPIRATION RATE: 18 BRPM | WEIGHT: 125 LBS | OXYGEN SATURATION: 100 %

## 2020-07-19 DIAGNOSIS — Z20.822 EXPOSURE TO COVID-19 VIRUS: Primary | ICD-10-CM

## 2020-07-19 LAB — SARS-COV-2 RNA RESP QL NAA+PROBE: NOT DETECTED

## 2020-07-19 PROCEDURE — 99283 EMERGENCY DEPT VISIT LOW MDM: CPT

## 2020-07-19 NOTE — ED PROVIDER NOTES
Patient Seen in: [facility] Emergency Department      History   Patient presents with:  Testing    Stated Complaint: covid +, wants second test    HPI    The patient is a 27-year-old female who had a COVID exposure 8 days ago.   5 days ago she had All other systems reviewed and negative except as noted above.     Physical Exam     ED Triage Vitals [07/19/20 0934]   /71   Pulse 75   Resp 18   Temp 98.3 °F (36.8 °C)   Temp src Oral   SpO2 100 %   O2 Device None (Room air)       Current:

## 2020-08-20 RX ORDER — VALACYCLOVIR HYDROCHLORIDE 1 G/1
TABLET, FILM COATED ORAL
Qty: 21 TABLET | Refills: 0 | Status: SHIPPED | OUTPATIENT
Start: 2020-08-20 | End: 2021-11-10

## 2020-08-24 RX ORDER — FLUOXETINE 10 MG/1
CAPSULE ORAL
Qty: 90 CAPSULE | Refills: 0 | Status: SHIPPED | OUTPATIENT
Start: 2020-08-24 | End: 2020-11-23

## 2020-10-06 ENCOUNTER — TELEPHONE (OUTPATIENT)
Dept: INTERNAL MEDICINE CLINIC | Facility: CLINIC | Age: 55
End: 2020-10-06

## 2020-10-06 NOTE — TELEPHONE ENCOUNTER
Called patient in regards to MyChart message below    States has dry , non productive cough for one month     Changed to video visit , previous on site appt cancelled     . Patient advised that there may be a co-pay involved in this type of visit.      Briseyda Paniagua

## 2020-10-13 ENCOUNTER — TELEMEDICINE (OUTPATIENT)
Dept: INTERNAL MEDICINE CLINIC | Facility: CLINIC | Age: 55
End: 2020-10-13
Payer: COMMERCIAL

## 2020-10-13 DIAGNOSIS — R05.9 COUGH: Primary | ICD-10-CM

## 2020-10-13 PROCEDURE — 99214 OFFICE O/P EST MOD 30 MIN: CPT | Performed by: INTERNAL MEDICINE

## 2020-10-14 NOTE — PROGRESS NOTES
Patient ID: Tisha Dickinson is a 48year old female. 1. Cough    Has a chronic cough that has been bothering her the last few months.      Virtual/Telephone Check-In    Tisha Dickinson verbally consents to a Virtual/Telephone Check-In service on pellets under the skin. Hormone therapy. , Disp: , Rfl:   •  Progesterone Micronized 200 MG Oral Cap, Take 200 mg by mouth daily.   , Disp: , Rfl: 0    Allergies:  Sulfa Antibiotics       RASH  Radiology Contrast *    RASH  Gluten Flour            RASH    C Asked        Sleep Concern: Not Asked        Stress Concern: Not Asked        Weight Concern: Not Asked        Special Diet: Not Asked        Back Care: Not Asked        Exercise: Not Asked        Bike Helmet: Not Asked        Seat Belt: Not Asked        S restrictions of visitation. There are limitations of this visit as no physical exam could be performed. Every conscious effort was taken to allow for sufficient and adequate time.   This billing was spent on reviewing labs, medications, radiology tests an

## 2020-10-17 ENCOUNTER — APPOINTMENT (OUTPATIENT)
Dept: LAB | Facility: HOSPITAL | Age: 55
End: 2020-10-17
Attending: INTERNAL MEDICINE
Payer: COMMERCIAL

## 2020-10-17 ENCOUNTER — HOSPITAL ENCOUNTER (OUTPATIENT)
Dept: GENERAL RADIOLOGY | Facility: HOSPITAL | Age: 55
Discharge: HOME | End: 2020-10-17
Attending: INTERNAL MEDICINE
Payer: COMMERCIAL

## 2020-10-17 DIAGNOSIS — R05.9 COUGH: ICD-10-CM

## 2020-10-17 PROCEDURE — 86769 SARS-COV-2 COVID-19 ANTIBODY: CPT

## 2020-10-17 PROCEDURE — 71046 X-RAY EXAM CHEST 2 VIEWS: CPT | Performed by: INTERNAL MEDICINE

## 2020-10-17 PROCEDURE — 36415 COLL VENOUS BLD VENIPUNCTURE: CPT

## 2020-11-23 RX ORDER — FLUOXETINE 10 MG/1
CAPSULE ORAL
Qty: 90 CAPSULE | Refills: 0 | Status: SHIPPED | OUTPATIENT
Start: 2020-11-23 | End: 2021-03-02

## 2020-12-15 ENCOUNTER — NURSE TRIAGE (OUTPATIENT)
Dept: INTERNAL MEDICINE CLINIC | Facility: CLINIC | Age: 55
End: 2020-12-15

## 2020-12-15 NOTE — TELEPHONE ENCOUNTER
Action Requested: Summary for Provider     []  Critical Lab, Recommendations Needed  [] Need Additional Advice  []   FYI    []   Need Orders  [] Need Medications Sent to Pharmacy  []  Other     SUMMARY: OV scheduled with Dr Mayda Starr for eval chest pain.

## 2020-12-21 ENCOUNTER — OFFICE VISIT (OUTPATIENT)
Dept: INTERNAL MEDICINE CLINIC | Facility: CLINIC | Age: 55
End: 2020-12-21
Payer: COMMERCIAL

## 2020-12-21 VITALS
RESPIRATION RATE: 16 BRPM | SYSTOLIC BLOOD PRESSURE: 116 MMHG | HEART RATE: 61 BPM | WEIGHT: 127 LBS | BODY MASS INDEX: 22.5 KG/M2 | DIASTOLIC BLOOD PRESSURE: 69 MMHG | HEIGHT: 63 IN

## 2020-12-21 DIAGNOSIS — R07.2 PRECORDIAL PAIN: Primary | ICD-10-CM

## 2020-12-21 PROCEDURE — 3078F DIAST BP <80 MM HG: CPT | Performed by: INTERNAL MEDICINE

## 2020-12-21 PROCEDURE — 99213 OFFICE O/P EST LOW 20 MIN: CPT | Performed by: INTERNAL MEDICINE

## 2020-12-21 PROCEDURE — 3074F SYST BP LT 130 MM HG: CPT | Performed by: INTERNAL MEDICINE

## 2020-12-21 PROCEDURE — 3008F BODY MASS INDEX DOCD: CPT | Performed by: INTERNAL MEDICINE

## 2020-12-21 NOTE — PROGRESS NOTES
Flor Mcgowan is a 48year old female. HPI:     1. Precordial pain    The patient has been having an intermittent chest pain directly under the sternum. The pain is not related to exertion and is not associated with sweating or palpitations.   The p Location: Right arm, Patient Position: Sitting, Cuff Size: adult)   Pulse 61   Resp 16   Ht 5' 3\" (1.6 m)   Wt 127 lb (57.6 kg)   BMI 22.50 kg/m²   GENERAL: well developed, well nourished,in no apparent distress  SKIN: no rashes,no suspicious lesions  HAYDEN

## 2020-12-22 ENCOUNTER — EKG ENCOUNTER (OUTPATIENT)
Dept: LAB | Age: 55
End: 2020-12-22
Attending: INTERNAL MEDICINE
Payer: COMMERCIAL

## 2020-12-22 DIAGNOSIS — R07.2 PRECORDIAL PAIN: ICD-10-CM

## 2020-12-22 PROCEDURE — 93010 ELECTROCARDIOGRAM REPORT: CPT | Performed by: INTERNAL MEDICINE

## 2020-12-22 PROCEDURE — 93005 ELECTROCARDIOGRAM TRACING: CPT

## 2021-01-25 ENCOUNTER — TELEPHONE (OUTPATIENT)
Dept: INTERNAL MEDICINE CLINIC | Facility: CLINIC | Age: 56
End: 2021-01-25

## 2021-02-01 DIAGNOSIS — Z23 NEED FOR VACCINATION: ICD-10-CM

## 2021-02-03 ENCOUNTER — IMMUNIZATION (OUTPATIENT)
Dept: LAB | Facility: HOSPITAL | Age: 56
End: 2021-02-03
Attending: EMERGENCY MEDICINE
Payer: COMMERCIAL

## 2021-02-03 DIAGNOSIS — Z23 NEED FOR VACCINATION: Primary | ICD-10-CM

## 2021-02-03 PROCEDURE — 0011A SARSCOV2 VAC 100MCG/0.5ML IM: CPT

## 2021-03-02 RX ORDER — FLUOXETINE 10 MG/1
10 CAPSULE ORAL DAILY
Qty: 90 CAPSULE | Refills: 0 | Status: SHIPPED | OUTPATIENT
Start: 2021-03-02 | End: 2021-05-12

## 2021-03-02 NOTE — TELEPHONE ENCOUNTER
Current Outpatient Medications:   •  FLUOXETINE HCL 10 MG Oral Cap, TAKE ONE CAPSULE BY MOUTH ONE TIME DAILY , Disp: 90 capsule, Rfl: 0

## 2021-03-03 ENCOUNTER — IMMUNIZATION (OUTPATIENT)
Dept: LAB | Facility: HOSPITAL | Age: 56
End: 2021-03-03
Attending: EMERGENCY MEDICINE
Payer: COMMERCIAL

## 2021-03-03 DIAGNOSIS — Z23 NEED FOR VACCINATION: Primary | ICD-10-CM

## 2021-03-03 PROCEDURE — 0012A SARSCOV2 VAC 100MCG/0.5ML IM: CPT

## 2021-05-11 ENCOUNTER — TELEPHONE (OUTPATIENT)
Dept: INTERNAL MEDICINE CLINIC | Facility: CLINIC | Age: 56
End: 2021-05-11

## 2021-05-11 DIAGNOSIS — Z12.31 BREAST CANCER SCREENING BY MAMMOGRAM: Primary | ICD-10-CM

## 2021-05-11 NOTE — TELEPHONE ENCOUNTER
Patient is requesting an order for Mammogram.    Please call or send a message thru [address] [place] [address] [address] when approved.     [phone]

## 2021-05-12 ENCOUNTER — OFFICE VISIT (OUTPATIENT)
Dept: INTERNAL MEDICINE CLINIC | Facility: CLINIC | Age: 56
End: 2021-05-12
Payer: COMMERCIAL

## 2021-05-12 VITALS
RESPIRATION RATE: 16 BRPM | SYSTOLIC BLOOD PRESSURE: 112 MMHG | WEIGHT: 132 LBS | HEIGHT: 63 IN | HEART RATE: 71 BPM | DIASTOLIC BLOOD PRESSURE: 72 MMHG | BODY MASS INDEX: 23.39 KG/M2

## 2021-05-12 DIAGNOSIS — F32.0 CURRENT MILD EPISODE OF MAJOR DEPRESSIVE DISORDER, UNSPECIFIED WHETHER RECURRENT (HCC): ICD-10-CM

## 2021-05-12 DIAGNOSIS — R10.9 INTERMITTENT ABDOMINAL PAIN: Primary | ICD-10-CM

## 2021-05-12 PROCEDURE — 99214 OFFICE O/P EST MOD 30 MIN: CPT | Performed by: INTERNAL MEDICINE

## 2021-05-12 PROCEDURE — 3074F SYST BP LT 130 MM HG: CPT | Performed by: INTERNAL MEDICINE

## 2021-05-12 PROCEDURE — 3008F BODY MASS INDEX DOCD: CPT | Performed by: INTERNAL MEDICINE

## 2021-05-12 PROCEDURE — 3078F DIAST BP <80 MM HG: CPT | Performed by: INTERNAL MEDICINE

## 2021-05-12 RX ORDER — FLUOXETINE 10 MG/1
10 CAPSULE ORAL DAILY
Qty: 90 CAPSULE | Refills: 3 | Status: SHIPPED | OUTPATIENT
Start: 2021-05-12 | End: 2021-05-24

## 2021-05-12 NOTE — PROGRESS NOTES
Carlito Burrell is a 48year old female. HPI:     1. Intermittent abdominal pain    Has been getting intermittent abdominal pain. Still moving bowels.   Has a history of diverticulitis in the past but the pain is not been constant and is not that sever Smoker      Smokeless tobacco: Never Used    Alcohol use: Yes      Comment: Weekends; wine, 3 glasses, weekly    Drug use: No       REVIEW OF SYSTEMS:   GENERAL HEALTH: feels well otherwise  SKIN: denies any unusual skin lesions or rashes  RESPIRATORY: den patient indicates understanding of these issues and agrees to the plan. The patient is asked to return in 3 months.

## 2021-05-12 NOTE — TELEPHONE ENCOUNTER
Last bilat screening lorne mammo done on 12/28/19. See pended order      LAST RESULT  CONCLUSION:         BI-RADS CATEGORY:     DIAGNOSTIC CATEGORY 1--NEGATIVE ASSESSMENT.         RECOMMENDATIONS:   ROUTINE MAMMOGRAM AND CLINICAL EVALUATION IN 12 MONTHS.

## 2021-05-19 ENCOUNTER — TELEPHONE (OUTPATIENT)
Dept: INTERNAL MEDICINE CLINIC | Facility: CLINIC | Age: 56
End: 2021-05-19

## 2021-05-19 ENCOUNTER — NURSE TRIAGE (OUTPATIENT)
Dept: INTERNAL MEDICINE CLINIC | Facility: CLINIC | Age: 56
End: 2021-05-19

## 2021-05-19 ENCOUNTER — LAB ENCOUNTER (OUTPATIENT)
Dept: LAB | Facility: HOSPITAL | Age: 56
End: 2021-05-19
Attending: INTERNAL MEDICINE
Payer: COMMERCIAL

## 2021-05-19 DIAGNOSIS — R10.9 INTERMITTENT ABDOMINAL PAIN: ICD-10-CM

## 2021-05-19 PROCEDURE — 36415 COLL VENOUS BLD VENIPUNCTURE: CPT

## 2021-05-19 PROCEDURE — 85025 COMPLETE CBC W/AUTO DIFF WBC: CPT

## 2021-05-19 RX ORDER — LEVOFLOXACIN 500 MG/1
500 TABLET, FILM COATED ORAL DAILY
Qty: 10 TABLET | Refills: 0 | Status: SHIPPED | OUTPATIENT
Start: 2021-05-19 | End: 2021-05-19

## 2021-05-19 RX ORDER — CIPROFLOXACIN 500 MG/1
500 TABLET, FILM COATED ORAL 2 TIMES DAILY
Qty: 20 TABLET | Refills: 0 | Status: SHIPPED | OUTPATIENT
Start: 2021-05-19 | End: 2021-05-29

## 2021-05-19 RX ORDER — METRONIDAZOLE 500 MG/1
500 TABLET ORAL 3 TIMES DAILY
Qty: 30 TABLET | Refills: 0 | Status: SHIPPED | OUTPATIENT
Start: 2021-05-19 | End: 2021-08-25 | Stop reason: ALTCHOICE

## 2021-05-19 RX ORDER — METRONIDAZOLE 500 MG/1
500 TABLET ORAL 3 TIMES DAILY
Qty: 30 TABLET | Refills: 0 | Status: SHIPPED | OUTPATIENT
Start: 2021-05-19 | End: 2021-05-19

## 2021-05-19 NOTE — TELEPHONE ENCOUNTER
Action Requested: Summary for Provider     []  Critical Lab, Recommendations Needed  [] Need Additional Advice  []   FYI    []   Need Orders  [x] Need Medications Sent to Pharmacy  []  Other     SUMMARY: Patient c/o left side pain constant for the last two

## 2021-05-19 NOTE — TELEPHONE ENCOUNTER
Patient called has elevated white count will start antibiotics for presumed diverticulitis Cipro 500 twice daily for 10 days Flagyl 500 3 times daily for 10 days.   [place] was [place] of [place] [facility]

## 2021-05-19 NOTE — TELEPHONE ENCOUNTER
A prescription for Levaquin 500 mg daily for 10 days and Flagyl 500 mg 3 times daily for 10 days have been sent to [facility] for presumed divcerticulitis.  Have the patient start these medications sticking more clear liquids monitor the abdominal pain and foll

## 2021-05-19 NOTE — TELEPHONE ENCOUNTER
Spoke with the patient,verified full name and , informed her of message and instructions below patient verbalized understanding no further questions

## 2021-05-19 NOTE — TELEPHONE ENCOUNTER
Patient has diverticulitis and is requesting pain medication for pain on her side. Shes having a lot of pain and was not able to attend work today.  Call with any questions    Pharmacy    [facility] [id number] [place] [address] [address] [phone]

## 2021-05-24 ENCOUNTER — TELEPHONE (OUTPATIENT)
Dept: INTERNAL MEDICINE CLINIC | Facility: CLINIC | Age: 56
End: 2021-05-24

## 2021-05-24 RX ORDER — FLUOXETINE 10 MG/1
CAPSULE ORAL
Qty: 90 CAPSULE | Refills: 0 | Status: SHIPPED | OUTPATIENT
Start: 2021-05-24 | End: 2021-08-23

## 2021-05-24 NOTE — TELEPHONE ENCOUNTER
Patient states office visit with Dr. Amado Cisneros on 5/12 for abdominal pain and given antibiotics for diverticulitis.     Since Friday, 5/21, patient states she believes she developed CDIFF as she \"could not leave the house,\" stopped taking Cipro, but nicole

## 2021-05-25 NOTE — TELEPHONE ENCOUNTER
The patient called back and stated she feels better. She continues to feel bloated but the pain is gone. She is going to [place] an Friday and stated she will hopefully feels better.     Informed if needed to call back before going on her trip for the d

## 2021-06-10 ENCOUNTER — MED REC SCAN ONLY (OUTPATIENT)
Dept: INTERNAL MEDICINE CLINIC | Facility: CLINIC | Age: 56
End: 2021-06-10

## 2021-07-13 ENCOUNTER — HOSPITAL ENCOUNTER (OUTPATIENT)
Dept: MAMMOGRAPHY | Age: 56
Discharge: HOME | End: 2021-07-13
Attending: INTERNAL MEDICINE
Payer: COMMERCIAL

## 2021-07-13 DIAGNOSIS — Z12.31 BREAST CANCER SCREENING BY MAMMOGRAM: ICD-10-CM

## 2021-07-13 PROCEDURE — 77063 BREAST TOMOSYNTHESIS BI: CPT | Performed by: INTERNAL MEDICINE

## 2021-07-13 PROCEDURE — 77067 SCR MAMMO BI INCL CAD: CPT | Performed by: INTERNAL MEDICINE

## 2021-07-21 ENCOUNTER — HOSPITAL ENCOUNTER (OUTPATIENT)
Dept: MAMMOGRAPHY | Facility: HOSPITAL | Age: 56
Discharge: HOME | End: 2021-07-21
Attending: INTERNAL MEDICINE
Payer: COMMERCIAL

## 2021-07-21 ENCOUNTER — HOSPITAL ENCOUNTER (OUTPATIENT)
Dept: ULTRASOUND IMAGING | Facility: HOSPITAL | Age: 56
Discharge: HOME | End: 2021-07-21
Attending: INTERNAL MEDICINE
Payer: COMMERCIAL

## 2021-07-21 DIAGNOSIS — R92.8 ABNORMAL MAMMOGRAM: ICD-10-CM

## 2021-07-21 PROCEDURE — 77066 DX MAMMO INCL CAD BI: CPT | Performed by: INTERNAL MEDICINE

## 2021-07-21 PROCEDURE — 77062 BREAST TOMOSYNTHESIS BI: CPT | Performed by: INTERNAL MEDICINE

## 2021-07-21 PROCEDURE — 76642 ULTRASOUND BREAST LIMITED: CPT | Performed by: INTERNAL MEDICINE

## 2021-07-21 NOTE — IMAGING NOTE
This nurse introduced self and role of breast coordinator. Discussed recommended breast biopsy with Eben Win. Ms. Viky Astorga was recommended by Dr. Kerry Faye to have a right breast ultrasound guided biopsy.    Pt history discussed as below:  INDIC on 7/21/2021 at 11:07 AM       Finalized by (CST): Loan Gray 281, DO on 7/21/2021 at 12:07 PM       Pt history of biopsy:  No       Family history of cancer:   unknown  Pt history of breast cancer:  No  Hx BCP use:     unknown               HRT use: at the  direction of your physician. Radiology's preference is to hold these medications for 5 days prior to biopsy. Denies use. Reviewed US guided biopsy procedure, as below.   You will be lying on your back, potentially slightly toward one side, for Educated patient that this helps with comfort after the biopsy and decrease swelling. Reviewed results process with patient and shared that pathology results will be available within 2-3 business days of their biopsy.   Discussed results will be communicat

## 2021-07-22 ENCOUNTER — HOSPITAL ENCOUNTER (OUTPATIENT)
Dept: ULTRASOUND IMAGING | Facility: HOSPITAL | Age: 56
Discharge: HOME | End: 2021-07-22
Attending: INTERNAL MEDICINE
Payer: COMMERCIAL

## 2021-07-22 ENCOUNTER — HOSPITAL ENCOUNTER (OUTPATIENT)
Dept: MAMMOGRAPHY | Facility: HOSPITAL | Age: 56
Discharge: HOME | End: 2021-07-22
Attending: INTERNAL MEDICINE
Payer: COMMERCIAL

## 2021-07-22 DIAGNOSIS — N63.10 BREAST MASS, RIGHT: ICD-10-CM

## 2021-07-22 PROCEDURE — 88305 TISSUE EXAM BY PATHOLOGIST: CPT | Performed by: INTERNAL MEDICINE

## 2021-07-22 PROCEDURE — 77065 DX MAMMO INCL CAD UNI: CPT | Performed by: INTERNAL MEDICINE

## 2021-07-22 PROCEDURE — 19083 BX BREAST 1ST LESION US IMAG: CPT | Performed by: INTERNAL MEDICINE

## 2021-07-22 NOTE — PROCEDURES
[facility]  Procedure Note    Lisa Soria Patient Status:  Outpatient    9/3/1965 MRN [id number]   Location [address] Attending Archie Zuñiga MD   Hosp Day # 0 PCP Qamar Carr MD
DISPLAY PLAN FREE TEXT

## 2021-07-26 ENCOUNTER — TELEPHONE (OUTPATIENT)
Dept: INTERNAL MEDICINE CLINIC | Facility: CLINIC | Age: 56
End: 2021-07-26

## 2021-07-26 NOTE — TELEPHONE ENCOUNTER
Dr Milvia Danielle: please assist on behalf of Dr. Alan George:    Quinn Russell, breast coordinator calling to f/u on referral request.     She would like to reach out to patient. See Pathology report.  Bx of Rt breast.

## 2021-07-26 NOTE — IMAGING NOTE
Lisa Fuchs is s/p biopsy . Phoned and introduced myself as breast coordinator . Reinforced to patient  post biopsy care and instructions .     Informed  and shared the pathology results as well as the recommendations from Dr Darrion Ibarra for her breast i

## 2021-07-26 NOTE — TELEPHONE ENCOUNTER
Jazmyn Reading from Radiology calling to ask which surgeon Dr Raheel Woodard wants pt to f/u up, based on biopsy results. PATHOLOGY RESULTS ARE CONSIDERED BENIGN AND CONCORDANT. SURGICAL CONSULT IS RECOMMENDED FOR THE ATYPICAL DUCTAL HYPERPLASIA.

## 2021-08-21 NOTE — TELEPHONE ENCOUNTER
RECVD FAX FOR RX REFILL      FLUOXETINE HCL 10 MG Oral Cap, take 1 capsule by mouth daily, Disp: 90 capsule, Rfl: 0

## 2021-08-23 RX ORDER — FLUOXETINE 10 MG/1
10 CAPSULE ORAL DAILY
Qty: 90 CAPSULE | Refills: 0 | Status: SHIPPED | OUTPATIENT
Start: 2021-08-23 | End: 2021-11-10

## 2021-08-25 ENCOUNTER — OFFICE VISIT (OUTPATIENT)
Dept: SURGERY | Facility: CLINIC | Age: 56
End: 2021-08-25
Payer: COMMERCIAL

## 2021-08-25 VITALS
BODY MASS INDEX: 22.61 KG/M2 | DIASTOLIC BLOOD PRESSURE: 83 MMHG | RESPIRATION RATE: 16 BRPM | WEIGHT: 127.63 LBS | SYSTOLIC BLOOD PRESSURE: 110 MMHG | HEIGHT: 63 IN | HEART RATE: 72 BPM | OXYGEN SATURATION: 98 %

## 2021-08-25 DIAGNOSIS — D24.1 BREAST FIBROADENOMA IN FEMALE, RIGHT: ICD-10-CM

## 2021-08-25 DIAGNOSIS — N60.91 ATYPICAL DUCTAL HYPERPLASIA OF RIGHT BREAST: Primary | ICD-10-CM

## 2021-08-25 PROCEDURE — 3074F SYST BP LT 130 MM HG: CPT | Performed by: SURGERY

## 2021-08-25 PROCEDURE — 3079F DIAST BP 80-89 MM HG: CPT | Performed by: SURGERY

## 2021-08-25 PROCEDURE — 99244 OFF/OP CNSLTJ NEW/EST MOD 40: CPT | Performed by: SURGERY

## 2021-08-25 PROCEDURE — 3008F BODY MASS INDEX DOCD: CPT | Performed by: SURGERY

## 2021-08-25 NOTE — PATIENT INSTRUCTIONS
Dr. Aron Ha  Tel: [phone]  Fax: [phone] [place], [place] [address]  [phone]     Surgery/Procedure: Right breast wire localized excisional biopsy     Anesthesia:   MAC  Surgery Length:   1 surgeon's office listed above. 11. Do not take any blood thinners at least one week prior to the procedure/surgery.  This includes aspirin, baby aspirin, Ibuprofen products, herbal supplements, diet medications, vitamin E, fish oil and green tea supplement

## 2021-08-25 NOTE — PROGRESS NOTES
Breast Surgery New Patient Consultation    This is the first visit for this 48year old woman, referred by Dr. Liyah Hwang, who presents for evaluation of ADH R breast.    History of Present Illness:   Ms. Laura Quigley is a 48year old woman who p & C      s/p SAB   • LAPAROSCOPY PROCEDURE UNLISTED      Laparoscopic Laser x 2., Dr Laura Lee (Endometriosis)   • LEEP         Gynecological History:  Pt is a   Pt was 46years old at time of first pregnancy.     She has cumulative breastfeeding hist ear drainage, earaches, nasal congestion, nose bleeds, +snoring, pain in mouth/throat, hoarseness, change in voice, facial trauma.     Respiratory:  The patient denies chronic cough, phlegm, hemoptysis, pleurisy/chest pain, pneumonia, +asthma, wheezing, dif despair. Endocrine: There is no history of poor/slow wound healing, weight loss/gain, +fertility or hormone problems, cold intolerance, thyroid disease. Allergic/Immunologic:  There is no history of hives, hay fever, angioedema or anaphylaxis. The skin appears normal. There are no suspicious appearing rashes or lesions. Extremities: The extremities are without deformity, cyanosis or edema.     Impression:   Ms. Simone Jason is a 48year old woman presents with dense breast, bilateral br

## 2021-09-09 ENCOUNTER — TELEPHONE (OUTPATIENT)
Dept: SURGERY | Facility: CLINIC | Age: 56
End: 2021-09-09

## 2021-09-09 DIAGNOSIS — N60.91 ATYPICAL DUCTAL HYPERPLASIA OF RIGHT BREAST: Primary | ICD-10-CM

## 2021-09-09 NOTE — TELEPHONE ENCOUNTER
----- Message from Jabari Lagos sent at 9/9/2021  2:04 PM CDT -----  Regarding: FW: Visit Follow-up Question  Contact: Gearldean Bumpers,    Can you please follow up with pt.     Thank you,  Moose Peoples  ----- Message -----  From: Akua Rosas

## 2021-09-09 NOTE — TELEPHONE ENCOUNTER
Calling pt in regards to scheduling surgery. Apologized to pt for the delay in scheduling her surgery. Pt was understanding. Informed pt that I have 09/23/21 or 10/04/21 available at [facility] with Dr. Alee Burnett.  Pt states she would like the 09/23/2

## 2021-09-10 DIAGNOSIS — N60.91 ATYPICAL DUCTAL HYPERPLASIA OF RIGHT BREAST: Primary | ICD-10-CM

## 2021-09-21 ENCOUNTER — LAB ENCOUNTER (OUTPATIENT)
Dept: LAB | Facility: HOSPITAL | Age: 56
End: 2021-09-21
Attending: SURGERY
Payer: COMMERCIAL

## 2021-09-21 DIAGNOSIS — Z01.818 PREOP TESTING: ICD-10-CM

## 2021-09-22 LAB — SARS-COV-2 RNA RESP QL NAA+PROBE: NOT DETECTED

## 2021-09-23 ENCOUNTER — HOSPITAL ENCOUNTER (OUTPATIENT)
Facility: HOSPITAL | Age: 56
Setting detail: HOSPITAL OUTPATIENT SURGERY
Discharge: HOME | End: 2021-09-23
Attending: SURGERY | Admitting: SURGERY
Payer: COMMERCIAL

## 2021-09-23 ENCOUNTER — ANESTHESIA (OUTPATIENT)
Dept: SURGERY | Facility: HOSPITAL | Age: 56
End: 2021-09-23
Payer: COMMERCIAL

## 2021-09-23 ENCOUNTER — HOSPITAL ENCOUNTER (OUTPATIENT)
Dept: MAMMOGRAPHY | Facility: HOSPITAL | Age: 56
Discharge: HOME | End: 2021-09-23
Attending: SURGERY
Payer: COMMERCIAL

## 2021-09-23 ENCOUNTER — HOSPITAL ENCOUNTER (OUTPATIENT)
Dept: ULTRASOUND IMAGING | Facility: HOSPITAL | Age: 56
Discharge: HOME | End: 2021-09-23
Attending: SURGERY
Payer: COMMERCIAL

## 2021-09-23 ENCOUNTER — APPOINTMENT (OUTPATIENT)
Dept: MAMMOGRAPHY | Facility: HOSPITAL | Age: 56
End: 2021-09-23
Attending: SURGERY
Payer: COMMERCIAL

## 2021-09-23 ENCOUNTER — ANESTHESIA EVENT (OUTPATIENT)
Dept: SURGERY | Facility: HOSPITAL | Age: 56
End: 2021-09-23
Payer: COMMERCIAL

## 2021-09-23 VITALS
HEIGHT: 63 IN | RESPIRATION RATE: 18 BRPM | HEART RATE: 59 BPM | WEIGHT: 128 LBS | BODY MASS INDEX: 22.68 KG/M2 | DIASTOLIC BLOOD PRESSURE: 59 MMHG | TEMPERATURE: 98 F | SYSTOLIC BLOOD PRESSURE: 107 MMHG | OXYGEN SATURATION: 99 %

## 2021-09-23 DIAGNOSIS — Z01.818 PREOP TESTING: Primary | ICD-10-CM

## 2021-09-23 DIAGNOSIS — N60.91 ATYPICAL DUCTAL HYPERPLASIA OF RIGHT BREAST: ICD-10-CM

## 2021-09-23 PROCEDURE — 88300 SURGICAL PATH GROSS: CPT | Performed by: SURGERY

## 2021-09-23 PROCEDURE — 76098 X-RAY EXAM SURGICAL SPECIMEN: CPT | Performed by: SURGERY

## 2021-09-23 PROCEDURE — A4648 IMPLANTABLE TISSUE MARKER: HCPCS

## 2021-09-23 PROCEDURE — 19285 PERQ DEV BREAST 1ST US IMAG: CPT | Performed by: SURGERY

## 2021-09-23 PROCEDURE — 0HBT0ZZ EXCISION OF RIGHT BREAST, OPEN APPROACH: ICD-10-PCS | Performed by: SURGERY

## 2021-09-23 PROCEDURE — 88307 TISSUE EXAM BY PATHOLOGIST: CPT | Performed by: SURGERY

## 2021-09-23 PROCEDURE — 77065 DX MAMMO INCL CAD UNI: CPT | Performed by: SURGERY

## 2021-09-23 PROCEDURE — 88360 TUMOR IMMUNOHISTOCHEM/MANUAL: CPT | Performed by: SURGERY

## 2021-09-23 RX ORDER — MORPHINE SULFATE 4 MG/ML
4 INJECTION, SOLUTION INTRAMUSCULAR; INTRAVENOUS EVERY 10 MIN PRN
Status: DISCONTINUED | OUTPATIENT
Start: 2021-09-23 | End: 2021-09-23

## 2021-09-23 RX ORDER — HYDROCODONE BITARTRATE AND ACETAMINOPHEN 5; 325 MG/1; MG/1
1-2 TABLET ORAL EVERY 6 HOURS PRN
Qty: 20 TABLET | Refills: 0 | Status: SHIPPED | OUTPATIENT
Start: 2021-09-23 | End: 2021-09-29 | Stop reason: ALTCHOICE

## 2021-09-23 RX ORDER — ACETAMINOPHEN 500 MG
1000 TABLET ORAL ONCE
Status: COMPLETED | OUTPATIENT
Start: 2021-09-23 | End: 2021-09-23

## 2021-09-23 RX ORDER — HYDROMORPHONE HYDROCHLORIDE 1 MG/ML
0.2 INJECTION, SOLUTION INTRAMUSCULAR; INTRAVENOUS; SUBCUTANEOUS EVERY 5 MIN PRN
Status: DISCONTINUED | OUTPATIENT
Start: 2021-09-23 | End: 2021-09-23

## 2021-09-23 RX ORDER — BUPIVACAINE HYDROCHLORIDE 5 MG/ML
INJECTION, SOLUTION EPIDURAL; INTRACAUDAL AS NEEDED
Status: DISCONTINUED | OUTPATIENT
Start: 2021-09-23 | End: 2021-09-23

## 2021-09-23 RX ORDER — ONDANSETRON 2 MG/ML
4 INJECTION INTRAMUSCULAR; INTRAVENOUS ONCE AS NEEDED
Status: DISCONTINUED | OUTPATIENT
Start: 2021-09-23 | End: 2021-09-23

## 2021-09-23 RX ORDER — HYDROMORPHONE HYDROCHLORIDE 1 MG/ML
0.6 INJECTION, SOLUTION INTRAMUSCULAR; INTRAVENOUS; SUBCUTANEOUS EVERY 5 MIN PRN
Status: DISCONTINUED | OUTPATIENT
Start: 2021-09-23 | End: 2021-09-23

## 2021-09-23 RX ORDER — MIDAZOLAM HYDROCHLORIDE 1 MG/ML
INJECTION INTRAMUSCULAR; INTRAVENOUS AS NEEDED
Status: DISCONTINUED | OUTPATIENT
Start: 2021-09-23 | End: 2021-09-23 | Stop reason: SURG

## 2021-09-23 RX ORDER — HYDROMORPHONE HYDROCHLORIDE 1 MG/ML
0.4 INJECTION, SOLUTION INTRAMUSCULAR; INTRAVENOUS; SUBCUTANEOUS EVERY 5 MIN PRN
Status: DISCONTINUED | OUTPATIENT
Start: 2021-09-23 | End: 2021-09-23

## 2021-09-23 RX ORDER — HYDROCODONE BITARTRATE AND ACETAMINOPHEN 5; 325 MG/1; MG/1
1 TABLET ORAL AS NEEDED
Status: DISCONTINUED | OUTPATIENT
Start: 2021-09-23 | End: 2021-09-23

## 2021-09-23 RX ORDER — NALOXONE HYDROCHLORIDE 0.4 MG/ML
80 INJECTION, SOLUTION INTRAMUSCULAR; INTRAVENOUS; SUBCUTANEOUS AS NEEDED
Status: DISCONTINUED | OUTPATIENT
Start: 2021-09-23 | End: 2021-09-23

## 2021-09-23 RX ORDER — HYDROCODONE BITARTRATE AND ACETAMINOPHEN 5; 325 MG/1; MG/1
2 TABLET ORAL AS NEEDED
Status: DISCONTINUED | OUTPATIENT
Start: 2021-09-23 | End: 2021-09-23

## 2021-09-23 RX ORDER — PROCHLORPERAZINE EDISYLATE 5 MG/ML
5 INJECTION INTRAMUSCULAR; INTRAVENOUS ONCE AS NEEDED
Status: DISCONTINUED | OUTPATIENT
Start: 2021-09-23 | End: 2021-09-23

## 2021-09-23 RX ORDER — MORPHINE SULFATE 10 MG/ML
6 INJECTION, SOLUTION INTRAMUSCULAR; INTRAVENOUS EVERY 10 MIN PRN
Status: DISCONTINUED | OUTPATIENT
Start: 2021-09-23 | End: 2021-09-23

## 2021-09-23 RX ORDER — HALOPERIDOL 5 MG/ML
0.25 INJECTION INTRAMUSCULAR ONCE AS NEEDED
Status: DISCONTINUED | OUTPATIENT
Start: 2021-09-23 | End: 2021-09-23

## 2021-09-23 RX ORDER — MORPHINE SULFATE 4 MG/ML
2 INJECTION, SOLUTION INTRAMUSCULAR; INTRAVENOUS EVERY 10 MIN PRN
Status: DISCONTINUED | OUTPATIENT
Start: 2021-09-23 | End: 2021-09-23

## 2021-09-23 RX ORDER — LIDOCAINE HYDROCHLORIDE AND EPINEPHRINE 10; 10 MG/ML; UG/ML
INJECTION, SOLUTION INFILTRATION; PERINEURAL AS NEEDED
Status: DISCONTINUED | OUTPATIENT
Start: 2021-09-23 | End: 2021-09-23

## 2021-09-23 RX ORDER — SODIUM CHLORIDE, SODIUM LACTATE, POTASSIUM CHLORIDE, CALCIUM CHLORIDE 600; 310; 30; 20 MG/100ML; MG/100ML; MG/100ML; MG/100ML
INJECTION, SOLUTION INTRAVENOUS CONTINUOUS
Status: DISCONTINUED | OUTPATIENT
Start: 2021-09-23 | End: 2021-09-23

## 2021-09-23 RX ORDER — CEFAZOLIN SODIUM/WATER 2 G/20 ML
2 SYRINGE (ML) INTRAVENOUS ONCE
Status: DISCONTINUED | OUTPATIENT
Start: 2021-09-23 | End: 2021-09-23 | Stop reason: HOSPADM

## 2021-09-23 RX ADMIN — SODIUM CHLORIDE, SODIUM LACTATE, POTASSIUM CHLORIDE, CALCIUM CHLORIDE: 600; 310; 30; 20 INJECTION, SOLUTION INTRAVENOUS at 12:19:00

## 2021-09-23 RX ADMIN — MIDAZOLAM HYDROCHLORIDE 2 MG: 1 INJECTION INTRAMUSCULAR; INTRAVENOUS at 11:36:00

## 2021-09-23 RX ADMIN — SODIUM CHLORIDE, SODIUM LACTATE, POTASSIUM CHLORIDE, CALCIUM CHLORIDE: 600; 310; 30; 20 INJECTION, SOLUTION INTRAVENOUS at 11:40:00

## 2021-09-23 NOTE — ANESTHESIA PREPROCEDURE EVALUATION
Anesthesia PreOp Note    HPI:     Tisha Dickinson is a 55year old female who presents for preoperative consultation requested by: Randi Smith MD    Date of Surgery: 9/23/2021    Procedure(s):  right breast wire localized excisional biopsy.  Radi Nortryptaline   • Infertility    • Mild depression (HCC)     Anti-depressant   • PONV (postoperative nausea and vomiting)    • Squamous cell cancer of skin of left cheek 11/19/2018    WIde excision lesion to left cheek and frozen section   • Visual impairm • Other (Throat Cancer) Paternal Uncle 72     Social History    Socioeconomic History      Marital status:       Spouse name: Not on file      Number of children: Not on file      Years of education: Not on file      Highest education level: Not o Activity:       Days of Exercise per Week: Not on file      Minutes of Exercise per Session: Not on file  Stress:       Feeling of Stress : Not on file  Social Connections:       Frequency of Communication with Friends and Family: Not on file      Frequenc Abdominal  - normal exam             Anesthesia Plan:   ASA:  1  Plan:   General  Informed Consent Plan and Risks Discussed With:  Patient      I have informed Denverdannie Carcamoers and/or legal guardian or family member of the nature of the anesthetic plan,

## 2021-09-23 NOTE — BRIEF OP NOTE
Pre-Operative Diagnosis: Atypical ductal hyperplasia of right breast [N60.91]     Post-Operative Diagnosis: Atypical ductal hyperplasia of right breast [N60.91]      Procedure Performed:   right breast wire localized excisional biopsy.  Radiology Instructio

## 2021-09-23 NOTE — ANESTHESIA POSTPROCEDURE EVALUATION
Patient: Adenike Cifuentes    Procedure Summary     Date: 09/23/21 Room / Location: [address] [place] [address] OR 08 / [address] [place] [address] OR    Anesthesia Start: 9783 Anesthesia Stop: 9049    Procedure: right breast wire localized excisional biopsy.  Radiology Instructions: right br

## 2021-09-23 NOTE — PROCEDURES
[facility]  Procedure Note    Jeffery Pereyra Patient Status:  Outpatient    9/3/1965 MRN [id number]   Location [place] [address] Attending Jayson Courtney MD   Hosp Day # 0 PCP No primary care provider on file.      Pro

## 2021-09-23 NOTE — H&P
History of Present Illness:   Ms. Isabel Ramos is a 48year old woman who presents with imaging detected concern of the right breast.  The patient denies any palpable masses, nipple discharge, skin changes or axillary symptoms.   She does not have a       Gynecological History:  Pt is a   Pt was 46years old at time of first pregnancy. She has cumulative breastfeeding history of 12 months. She achieved menarche at age 16. Age of Menopause: 51  Type: natural menopause.   She has history of ho hoarseness, change in voice, facial trauma.     Respiratory:  The patient denies chronic cough, phlegm, hemoptysis, pleurisy/chest pain, pneumonia, +asthma, wheezing, difficulty in breathing with exertion, emphysema, chronic bronchitis, shortness of breath weight loss/gain, +fertility or hormone problems, cold intolerance, thyroid disease.      Allergic/Immunologic:  There is no history of hives, hay fever, angioedema or anaphylaxis.     /83 (BP Location: Left arm, Patient Position: Sitting, Cuff Size: rashes or lesions.     Extremities:  The extremities are without deformity, cyanosis or edema.     Impression:   Ms. Jessica Bains is a 48year old woman presents with dense breast, bilateral breast cysts, biopsy confirmed right breast fibroadenoma wi the procedure, including risks, benefits and side effects related to the alternatives and risks related to not receiving this procedure. We will proceed with procedure as planned.

## 2021-09-23 NOTE — IMAGING NOTE
3936 Pt  to ultrasound department scouts completed by Reinier Contreras us tech      0730 Hx taken procedure explained questions answered.       1147  Consent signed and verified prior to [address] arrival.      Stephon Lamb here scanning completed Order verified and s

## 2021-09-24 NOTE — OPERATIVE REPORT
[facility]    PATIENT'S NAME: Edmundo Huynh   ATTENDING PHYSICIAN: Jeannette Melissa MD   OPERATING PHYSICIAN: Jeannette Nichols.  Ratna Melissa MD   PATIENT ACCOUNT#:   [id number]    LOCATION:  [facility] 10  MEDICAL RECORD #:   [id number] blade knife in the skin. The wire was identified and brought into the field.   Using sharp dissection and electrocautery, a segment of breast tissue surrounding the tip of the wire was excised, carefully oriented with a short stitch, single clip superiorly

## 2021-09-27 ENCOUNTER — TELEPHONE (OUTPATIENT)
Dept: SURGERY | Facility: CLINIC | Age: 56
End: 2021-09-27

## 2021-09-27 NOTE — TELEPHONE ENCOUNTER
I spoke with the patient regarding her pathology upgrade to DCIS. Explained to the patient that her margins appear negative but close and that reexcision and/or further treatment with radiation and her medications may be recommended in light of this.   I cedric

## 2021-09-29 ENCOUNTER — OFFICE VISIT (OUTPATIENT)
Dept: SURGERY | Facility: CLINIC | Age: 56
End: 2021-09-29
Payer: COMMERCIAL

## 2021-09-29 VITALS
RESPIRATION RATE: 18 BRPM | HEART RATE: 62 BPM | DIASTOLIC BLOOD PRESSURE: 83 MMHG | TEMPERATURE: 98 F | SYSTOLIC BLOOD PRESSURE: 130 MMHG | HEIGHT: 63 IN | WEIGHT: 128 LBS | OXYGEN SATURATION: 98 % | BODY MASS INDEX: 22.68 KG/M2

## 2021-09-29 DIAGNOSIS — D05.11 BREAST NEOPLASM, TIS (DCIS), RIGHT: Primary | ICD-10-CM

## 2021-09-29 PROCEDURE — 3008F BODY MASS INDEX DOCD: CPT | Performed by: SURGERY

## 2021-09-29 PROCEDURE — 3075F SYST BP GE 130 - 139MM HG: CPT | Performed by: SURGERY

## 2021-09-29 PROCEDURE — 99024 POSTOP FOLLOW-UP VISIT: CPT | Performed by: SURGERY

## 2021-09-29 PROCEDURE — 3079F DIAST BP 80-89 MM HG: CPT | Performed by: SURGERY

## 2021-09-29 RX ORDER — PROGESTERONE 200 MG/1
200 CAPSULE ORAL NIGHTLY
COMMUNITY

## 2021-09-29 NOTE — PATIENT INSTRUCTIONS
Dr. Sven Page  Tel: [phone]  Fax: [phone]  [address] [facility] [address] [place] [address]  [phone]     Surgery/Procedure: Bilateral nipple sparing mastectomy, right lymphoscintigraphy, right sentinel lymph node and directions on where to go. They begin making calls after 2pm, if you are not contacted by 4pm, please call the surgeon's office listed above. 11. Do not take any blood thinners at least one week prior to the procedure/surgery.  This includes aspirin, b

## 2021-09-30 ENCOUNTER — NURSE NAVIGATOR ENCOUNTER (OUTPATIENT)
Dept: HEMATOLOGY/ONCOLOGY | Facility: HOSPITAL | Age: 56
End: 2021-09-30

## 2021-09-30 DIAGNOSIS — D05.11 BREAST NEOPLASM, TIS (DCIS), RIGHT: Primary | ICD-10-CM

## 2021-09-30 NOTE — PROGRESS NOTES
Patient presents to the [facility] for post surgical follow up with Dr. Alee Burnett. Surgical pathology from her excisional biopsy indicates right breast DCIS. Dr. Alee Burnett has dicussed with patient need to proceed with mastectomy.   Patient wishes to proceed

## 2021-10-01 ENCOUNTER — OFFICE VISIT (OUTPATIENT)
Dept: SURGERY | Facility: CLINIC | Age: 56
End: 2021-10-01
Payer: COMMERCIAL

## 2021-10-01 VITALS
OXYGEN SATURATION: 98 % | WEIGHT: 127 LBS | BODY MASS INDEX: 22.5 KG/M2 | DIASTOLIC BLOOD PRESSURE: 83 MMHG | HEART RATE: 65 BPM | SYSTOLIC BLOOD PRESSURE: 126 MMHG | RESPIRATION RATE: 16 BRPM | HEIGHT: 63 IN

## 2021-10-01 DIAGNOSIS — D05.11 DUCTAL CARCINOMA IN SITU (DCIS) OF RIGHT BREAST: Primary | ICD-10-CM

## 2021-10-01 PROCEDURE — 3008F BODY MASS INDEX DOCD: CPT | Performed by: SURGERY

## 2021-10-01 PROCEDURE — 3079F DIAST BP 80-89 MM HG: CPT | Performed by: SURGERY

## 2021-10-01 PROCEDURE — 3074F SYST BP LT 130 MM HG: CPT | Performed by: SURGERY

## 2021-10-01 PROCEDURE — 99243 OFF/OP CNSLTJ NEW/EST LOW 30: CPT | Performed by: SURGERY

## 2021-10-01 NOTE — CONSULTS
New Patient Consultation    This is the first visit for this 55year old female who presents to discuss reconstructive options following surgery for breast cancer. History of Present Illness:    The patient is a 55year old female who presents with a r 0  VALACYCLOVIR HCL 1 G Oral Tab, TAKE ONE TABLET BY MOUTH AS NEEDED , Disp: 21 tablet, Rfl: 0          Allergies:      Sulfa Antibiotics       RASH  Radiology Contrast *    RASH  Gluten Flour            RASH    Comment:Digestive,aches and rash      Family diarrhea, constipation, change in bowel habits, or abdominal pain.+ Diverticulitis requiring hospitalization for IV antibiotics x2    Genitourinary:  The patient denies frequent urination, needing to get up at night to urinate, urinary hesitancy or retaini breast: Grade 1 ptosis is noted. Striae are absent. A well-healed incision is noted at the lateral areolar border. Measurements: sternal notch to nipple 22.5cm, nipple to inframammary fold 7.5cm, base diameter 14cm.   The skin, nipple, and areola appear n expansion process, as well as the need for a secondary procedure for placement of a permanent implant, were reviewed. Representative illustrations and photographs were demonstrated to the patient.  The risks of surgery including but not limited to bleeding

## 2021-10-04 ENCOUNTER — TELEPHONE (OUTPATIENT)
Dept: SURGERY | Facility: CLINIC | Age: 56
End: 2021-10-04

## 2021-10-05 DIAGNOSIS — D05.11 DUCTAL CARCINOMA IN SITU (DCIS) OF RIGHT BREAST: Primary | ICD-10-CM

## 2021-10-05 NOTE — TELEPHONE ENCOUNTER
Pt called back in regards to scheduling surgery. 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 Informed pt that if she is willing to go to [facility] I have 11/18/21 available.  Pt verbalized understanding

## 2021-10-05 NOTE — TELEPHONE ENCOUNTER
Calling pt in regards to scheduling surgery with Dr. Lenin Oliva and Dr. Anatoly Lujan. LVM requesting call back. Contact info given.

## 2021-10-11 NOTE — PROGRESS NOTES
Breast Surgery Post-Operative Visit    Diagnosis: Right breast atypical ductal hyperplasia upgraded to DCIS on lumpectomy September 23, 2021. Stage: TisNxMx    Disease Status:  Surgical treatment incomplete, multiple positive margins management pending. lesion to left cheek and frozen section   • Visual impairment     readers       Past Surgical History:   Procedure Laterality Date   • ADJ TISS XFER HEAD,FAC,HAND 10.1-30 Left 11/30/2018    WIde excision lesion to left cheek and frozen section   • C-SECTIO Paternal Uncle 72       She is not of Ashkenazi Hindu ancestry. Social History:    Alcohol use Yes   Comment: Weekends; wine, 3 glasses, weekly       Smoking status: Never Smoker   Smokeless tobacco: Never Used     Ms. Alejandrina Orellana is  wi skin lesions, dry skin, change in skin color or change in moles. Hematologic/Lymphatic:  The patient denies easily bruising or bleeding or persistent swollen glands or lymph nodes.      Musculoskeletal:  The patient denies muscle aches/pain, joint pain, pectoralis. There is no nipple retraction. No nipple discharge can be elicited. The parenchyma is mildly nodular.  There are no dominant masses in the breast. The axillary tail is normal.  There is a well-healed incision on the breast with no signs of eryth breast conservation and mastectomy (with or without reconstruction), including the fact that survival rates are essentially equal with these two approaches.  If breast conservation is elected, I explained the need for free margins, the  possibility of re-ex

## 2021-10-21 ENCOUNTER — TELEPHONE (OUTPATIENT)
Dept: SURGERY | Facility: CLINIC | Age: 56
End: 2021-10-21

## 2021-10-21 NOTE — TELEPHONE ENCOUNTER
Returned pt phone call regarding the surgical plan and how her 2nd opinion appt went with Dr. Khadra Parker.   Pt states that Dr. Khadra Parker agreed with the surgical plan, but did say he wanted to do an MRI and that he doesn't think a nipple sparing mastectomy wou

## 2021-10-22 ENCOUNTER — TELEPHONE (OUTPATIENT)
Dept: SURGERY | Facility: CLINIC | Age: 56
End: 2021-10-22

## 2021-10-22 ENCOUNTER — TELEPHONE (OUTPATIENT)
Dept: RHEUMATOLOGY | Facility: CLINIC | Age: 56
End: 2021-10-22

## 2021-10-22 ENCOUNTER — TELEPHONE (OUTPATIENT)
Dept: INTERNAL MEDICINE CLINIC | Facility: CLINIC | Age: 56
End: 2021-10-22

## 2021-10-22 NOTE — TELEPHONE ENCOUNTER
Former patient of Dr. Thee Cool requesting to establish care with Dr. Maricarmen Smith, patient was recently diagnosed with breast cancer and will be having surgery 11/18. Patient would like to come in to see Dr. Hiral Tapia before her surgery but cant miss work.

## 2021-10-22 NOTE — TELEPHONE ENCOUNTER
Attempted to call pt back regarding her questions after having her second opinion. No answer. LVM after verifying verbal release.   Informed pt that per Dr. Denice Nick, \"I never recommend MRI post-lumpectomy as it will not change the management regarding a ma

## 2021-10-26 NOTE — TELEPHONE ENCOUNTER
Pt made an appt to see Dr. Saman Peguero on 11-10-21 at 5:00 used res 24 ok 'd per the doctors message below. Pt states that this will be for a pre op clearance.

## 2021-11-04 ENCOUNTER — TELEPHONE (OUTPATIENT)
Dept: GENERAL RADIOLOGY | Facility: HOSPITAL | Age: 56
End: 2021-11-04

## 2021-11-09 ENCOUNTER — OFFICE VISIT (OUTPATIENT)
Dept: PHYSICAL THERAPY | Facility: HOSPITAL | Age: 56
End: 2021-11-09
Attending: SURGERY
Payer: COMMERCIAL

## 2021-11-09 DIAGNOSIS — D05.11 BREAST NEOPLASM, TIS (DCIS), RIGHT: ICD-10-CM

## 2021-11-10 ENCOUNTER — OFFICE VISIT (OUTPATIENT)
Dept: INTERNAL MEDICINE CLINIC | Facility: CLINIC | Age: 56
End: 2021-11-10
Payer: COMMERCIAL

## 2021-11-10 VITALS
HEART RATE: 64 BPM | HEIGHT: 63 IN | SYSTOLIC BLOOD PRESSURE: 105 MMHG | DIASTOLIC BLOOD PRESSURE: 70 MMHG | BODY MASS INDEX: 22.68 KG/M2 | WEIGHT: 128 LBS

## 2021-11-10 DIAGNOSIS — Z01.818 PREOP EXAM FOR INTERNAL MEDICINE: Primary | ICD-10-CM

## 2021-11-10 DIAGNOSIS — D05.11 DUCTAL CARCINOMA IN SITU (DCIS) OF RIGHT BREAST: ICD-10-CM

## 2021-11-10 DIAGNOSIS — D05.11 DUCTAL CARCINOMA IN SITU (DCIS) OF RIGHT BREAST: Primary | ICD-10-CM

## 2021-11-10 PROBLEM — D05.10 DCIS (DUCTAL CARCINOMA IN SITU): Status: ACTIVE | Noted: 2021-09-30

## 2021-11-10 PROBLEM — R05.9 COUGH: Status: RESOLVED | Noted: 2018-11-21 | Resolved: 2021-11-10

## 2021-11-10 PROBLEM — L08.9 INFECTED CYST OF SKIN: Status: RESOLVED | Noted: 2020-02-10 | Resolved: 2021-11-10

## 2021-11-10 PROBLEM — R07.9 LEFT SIDED CHEST PAIN: Status: RESOLVED | Noted: 2018-11-21 | Resolved: 2021-11-10

## 2021-11-10 PROBLEM — L72.9 INFECTED CYST OF SKIN: Status: RESOLVED | Noted: 2020-02-10 | Resolved: 2021-11-10

## 2021-11-10 PROCEDURE — 3074F SYST BP LT 130 MM HG: CPT | Performed by: INTERNAL MEDICINE

## 2021-11-10 PROCEDURE — 3008F BODY MASS INDEX DOCD: CPT | Performed by: INTERNAL MEDICINE

## 2021-11-10 PROCEDURE — 3078F DIAST BP <80 MM HG: CPT | Performed by: INTERNAL MEDICINE

## 2021-11-10 PROCEDURE — 99213 OFFICE O/P EST LOW 20 MIN: CPT | Performed by: INTERNAL MEDICINE

## 2021-11-10 RX ORDER — VALACYCLOVIR HYDROCHLORIDE 1 G/1
1000 TABLET, FILM COATED ORAL AS NEEDED
Qty: 21 TABLET | Refills: 0 | Status: SHIPPED | OUTPATIENT
Start: 2021-11-10

## 2021-11-10 RX ORDER — FLUOXETINE 10 MG/1
10 CAPSULE ORAL DAILY
Qty: 90 CAPSULE | Refills: 0 | Status: SHIPPED | OUTPATIENT
Start: 2021-11-10

## 2021-11-10 NOTE — PROGRESS NOTES
Homer Lee is a 55year old female who presents for a pre-operative physical exam. Patient is to have b/l mastectomy , to be done by Dr. Chitra Butler  at [facility]  Pt has had previous anesthesia:  Yes.   Previous complications:  Vomiting   Patien of Onset   • Heart Disorder Father         Sudden cardiac death   • Dementia Father    • Lung Disorder Father         COPD   • Anxiety Mother 76   • Lung Disorder Mother [de-identified]        COPD   • Depression Brother 52   • Gastro-Intestinal Disorder Brother normocephalic,ears and throat are clear  EYES:PERRLA, EOMI,conjunctiva are clear  NECK: supple,no adenopathy,no bruits  CHEST: no chest tenderness  BREAST: no dominant or suspicious mass  LUNGS: clear to auscultation  CARDIO: RRR without murmur  GI: good B recommended CAP guidelines time fixation (6-72 hours ). All antibodies are validated to document appropriate staining reactions. All immunohistochemical positive controls are examined and showed appropriate reactivity.  Validation, Performance, reporti Micropapillary     Architectural Patterns:    Solid     Nuclear Grade:    Grade III (high)     Necrosis:    Present, focal (small foci or single cell necrosis)     Microcalcifications:    Present in nonneoplastic tissue       MARGINS    Margins:    Uninvol margins. A metallic biopsy clip is found in slice 3 (gross examination  only). No definitive masses are grossly identified.  The specimen is entirely submitted as follows: A1, slice 1 (medial), perpendicular  sections; L2-P1, slices 2-6, respectively (clip

## 2021-11-10 NOTE — PROGRESS NOTES
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

## 2021-11-16 ENCOUNTER — HOSPITAL ENCOUNTER (OUTPATIENT)
Dept: GENERAL RADIOLOGY | Facility: HOSPITAL | Age: 56
Discharge: HOME | End: 2021-11-16
Attending: INTERNAL MEDICINE
Payer: COMMERCIAL

## 2021-11-16 ENCOUNTER — LAB ENCOUNTER (OUTPATIENT)
Dept: LAB | Facility: HOSPITAL | Age: 56
End: 2021-11-16
Attending: INTERNAL MEDICINE
Payer: COMMERCIAL

## 2021-11-16 DIAGNOSIS — Z01.818 PREOP EXAM FOR INTERNAL MEDICINE: ICD-10-CM

## 2021-11-16 PROCEDURE — 85025 COMPLETE CBC W/AUTO DIFF WBC: CPT

## 2021-11-16 PROCEDURE — 93010 ELECTROCARDIOGRAM REPORT: CPT | Performed by: INTERNAL MEDICINE

## 2021-11-16 PROCEDURE — 85610 PROTHROMBIN TIME: CPT

## 2021-11-16 PROCEDURE — 93005 ELECTROCARDIOGRAM TRACING: CPT

## 2021-11-16 PROCEDURE — 80048 BASIC METABOLIC PNL TOTAL CA: CPT

## 2021-11-16 PROCEDURE — 85730 THROMBOPLASTIN TIME PARTIAL: CPT

## 2021-11-16 PROCEDURE — 71046 X-RAY EXAM CHEST 2 VIEWS: CPT | Performed by: INTERNAL MEDICINE

## 2021-11-16 PROCEDURE — 36415 COLL VENOUS BLD VENIPUNCTURE: CPT

## 2021-12-04 ENCOUNTER — HOSPITAL ENCOUNTER (OUTPATIENT)
Dept: CV DIAGNOSTICS | Facility: HOSPITAL | Age: 56
Discharge: HOME | End: 2021-12-04
Attending: INTERNAL MEDICINE
Payer: COMMERCIAL

## 2021-12-04 DIAGNOSIS — R94.31 ABNORMAL EKG: ICD-10-CM

## 2021-12-04 PROCEDURE — 93306 TTE W/DOPPLER COMPLETE: CPT | Performed by: INTERNAL MEDICINE

## 2021-12-27 ENCOUNTER — IMMUNIZATION (OUTPATIENT)
Dept: LAB | Facility: HOSPITAL | Age: 56
End: 2021-12-27
Attending: EMERGENCY MEDICINE
Payer: COMMERCIAL

## 2021-12-27 DIAGNOSIS — Z23 NEED FOR VACCINATION: Primary | ICD-10-CM

## 2021-12-27 PROCEDURE — 0064A SARSCOV2 VAC 50MCG/0.25ML IM: CPT

## 2022-01-24 ENCOUNTER — MED REC SCAN ONLY (OUTPATIENT)
Dept: INTERNAL MEDICINE CLINIC | Facility: CLINIC | Age: 57
End: 2022-01-24

## 2022-02-23 RX ORDER — FLUOXETINE 10 MG/1
10 CAPSULE ORAL DAILY
Qty: 90 CAPSULE | Refills: 1 | Status: SHIPPED | OUTPATIENT
Start: 2022-02-23

## 2022-02-23 NOTE — TELEPHONE ENCOUNTER
Refill passed per [place] [facility] protocol.   Requested Prescriptions   Pending Prescriptions Disp Refills    FLUOXETINE 10 MG Oral Cap [Pharmacy Med Name: FLUoxetine HCl Oral Capsule 10 MG] 90 capsule 0     Sig: TAKE ONE CAPSULE BY MOUTH ONE TIME DAILY        Psychiatric Non-Scheduled (Anti-Anxiety) Passed - 2/23/2022  6:42 AM        Passed - Appointment in last 6 or next 3 months            Recent Outpatient Visits              3 months ago Preop exam for internal medicine    [address] [place], [place] [address]Jose MD    Office Visit    3 months ago Breast neoplasm, Tis (DCIS), right    [place], [place]    Office Visit    4 months ago Ductal carcinoma in situ (DCIS) of right breast    Alonzo Squires MD    Office Visit    4 months ago Breast neoplasm, Tis (DCIS), right    [facility] Tena Lombardo MD    Office Visit    6 months ago Atypical ductal hyperplasia of right breast    [facility] Tena Lombardo MD    Office Visit

## 2022-03-22 ENCOUNTER — TELEMEDICINE (OUTPATIENT)
Dept: INTERNAL MEDICINE CLINIC | Facility: CLINIC | Age: 57
End: 2022-03-22
Payer: COMMERCIAL

## 2022-03-22 DIAGNOSIS — R10.32 LEFT LOWER QUADRANT PAIN: Primary | ICD-10-CM

## 2022-03-22 PROCEDURE — 99213 OFFICE O/P EST LOW 20 MIN: CPT | Performed by: NURSE PRACTITIONER

## 2022-03-22 RX ORDER — AMOXICILLIN AND CLAVULANATE POTASSIUM 875; 125 MG/1; MG/1
1 TABLET, FILM COATED ORAL 2 TIMES DAILY
Qty: 20 TABLET | Refills: 0 | Status: SHIPPED | OUTPATIENT
Start: 2022-03-22 | End: 2022-04-01

## 2022-04-09 ENCOUNTER — TELEPHONE (OUTPATIENT)
Dept: INTERNAL MEDICINE CLINIC | Facility: CLINIC | Age: 57
End: 2022-04-09

## 2022-04-09 ENCOUNTER — PATIENT MESSAGE (OUTPATIENT)
Dept: INTERNAL MEDICINE CLINIC | Facility: CLINIC | Age: 57
End: 2022-04-09

## 2022-04-09 RX ORDER — CIPROFLOXACIN 500 MG/1
500 TABLET, FILM COATED ORAL 2 TIMES DAILY
Qty: 20 TABLET | Refills: 0 | Status: SHIPPED | OUTPATIENT
Start: 2022-04-09 | End: 2022-04-19

## 2022-04-09 RX ORDER — METRONIDAZOLE 500 MG/1
500 TABLET ORAL 3 TIMES DAILY
Qty: 30 TABLET | Refills: 0 | Status: SHIPPED | OUTPATIENT
Start: 2022-04-09 | End: 2022-04-19

## 2022-04-09 NOTE — TELEPHONE ENCOUNTER
Alfonzo Harrell RN 4/9/2022 10:38 AM CDT        ----- Message -----  From: Mariam Parker  Sent: 4/9/2022 8:16 AM CDT  To: Em Rn Triage  Subject: Diverticulitis return     Hi there. I had a televisit with you recently regarding my diverticulitis. You prescribed augmentin which worked but now it's back again. I'm wondering if I need to try a more potent dose or try Ciproflaxin and flagyl. Please let me know.  Thank you

## 2022-04-09 NOTE — TELEPHONE ENCOUNTER
Spoke to patient. Name and  verified. Went on vacation to [facility]. Did drink alcohol daily while taking augmentin. Came back last . symptoms started Thursday    Left side pain-6-8/10, no fever, +waking in sleep from discomfort    Feels constipated. Felt better after bowel movement, but pain returns. Patient requesting Cipro and Flagyl combination. Please advise. Thanks. Pharmacy verified. To call back or go to ER if s/s worsen of fail to improve as anticipated. Verbalized good understanding of all with intent to comply.

## 2022-04-09 NOTE — TELEPHONE ENCOUNTER
AGUILAR Bravo 2 hours ago (8:16 AM)     Hi there. I had a televisit with you recently regarding my diverticulitis. You prescribed augmentin which worked but now it's back again. I'm wondering if I need to try a more potent dose or try Ciproflaxin and flagyl. Please let me know.  Thank you

## 2022-07-11 ENCOUNTER — HOSPITAL ENCOUNTER (OUTPATIENT)
Age: 57
Discharge: HOME | End: 2022-07-11
Payer: COMMERCIAL

## 2022-07-11 VITALS
DIASTOLIC BLOOD PRESSURE: 67 MMHG | RESPIRATION RATE: 16 BRPM | TEMPERATURE: 98 F | HEIGHT: 63 IN | OXYGEN SATURATION: 99 % | HEART RATE: 67 BPM | SYSTOLIC BLOOD PRESSURE: 138 MMHG | WEIGHT: 130 LBS | BODY MASS INDEX: 23.04 KG/M2

## 2022-07-11 DIAGNOSIS — Z20.822 ENCOUNTER FOR LABORATORY TESTING FOR COVID-19 VIRUS: Primary | ICD-10-CM

## 2022-07-11 DIAGNOSIS — R05.9 COUGH: ICD-10-CM

## 2022-07-11 LAB — SARS-COV-2 RNA RESP QL NAA+PROBE: NOT DETECTED

## 2022-07-11 PROCEDURE — U0002 COVID-19 LAB TEST NON-CDC: HCPCS | Performed by: PHYSICIAN ASSISTANT

## 2022-07-11 PROCEDURE — 99213 OFFICE O/P EST LOW 20 MIN: CPT | Performed by: PHYSICIAN ASSISTANT

## 2022-07-11 NOTE — ED INITIAL ASSESSMENT (HPI)
Pt reports nasal congestion, cough, which started yesterday.  Here for PCR test.  is covid positive Follow-up in 6wks for Postpartum visit

## 2022-08-13 RX ORDER — FLUOXETINE 10 MG/1
CAPSULE ORAL
Qty: 90 CAPSULE | Refills: 0 | Status: SHIPPED | OUTPATIENT
Start: 2022-08-13

## 2022-11-01 ENCOUNTER — OFFICE VISIT (OUTPATIENT)
Dept: INTERNAL MEDICINE CLINIC | Facility: CLINIC | Age: 57
End: 2022-11-01
Payer: COMMERCIAL

## 2022-11-01 ENCOUNTER — NURSE TRIAGE (OUTPATIENT)
Dept: INTERNAL MEDICINE CLINIC | Facility: CLINIC | Age: 57
End: 2022-11-01

## 2022-11-01 VITALS
DIASTOLIC BLOOD PRESSURE: 82 MMHG | TEMPERATURE: 98 F | HEIGHT: 63 IN | RESPIRATION RATE: 16 BRPM | BODY MASS INDEX: 24.45 KG/M2 | SYSTOLIC BLOOD PRESSURE: 121 MMHG | HEART RATE: 70 BPM | WEIGHT: 138 LBS

## 2022-11-01 DIAGNOSIS — R10.32 LEFT LOWER QUADRANT PAIN: Primary | ICD-10-CM

## 2022-11-01 PROCEDURE — 3074F SYST BP LT 130 MM HG: CPT | Performed by: NURSE PRACTITIONER

## 2022-11-01 PROCEDURE — 3079F DIAST BP 80-89 MM HG: CPT | Performed by: NURSE PRACTITIONER

## 2022-11-01 PROCEDURE — 3008F BODY MASS INDEX DOCD: CPT | Performed by: NURSE PRACTITIONER

## 2022-11-01 PROCEDURE — 99213 OFFICE O/P EST LOW 20 MIN: CPT | Performed by: NURSE PRACTITIONER

## 2022-11-01 RX ORDER — CIPROFLOXACIN 500 MG/1
500 TABLET, FILM COATED ORAL 2 TIMES DAILY
Qty: 20 TABLET | Refills: 0 | Status: SHIPPED | OUTPATIENT
Start: 2022-11-01 | End: 2022-11-11

## 2022-11-01 RX ORDER — METRONIDAZOLE 500 MG/1
500 TABLET ORAL 3 TIMES DAILY
Qty: 30 TABLET | Refills: 0 | Status: SHIPPED | OUTPATIENT
Start: 2022-11-01 | End: 2022-11-11

## 2022-11-08 ENCOUNTER — OFFICE VISIT (OUTPATIENT)
Dept: INTERNAL MEDICINE CLINIC | Facility: CLINIC | Age: 57
End: 2022-11-08
Payer: COMMERCIAL

## 2022-11-08 VITALS
HEIGHT: 63 IN | WEIGHT: 135.63 LBS | DIASTOLIC BLOOD PRESSURE: 73 MMHG | SYSTOLIC BLOOD PRESSURE: 111 MMHG | HEART RATE: 75 BPM | RESPIRATION RATE: 16 BRPM | BODY MASS INDEX: 24.03 KG/M2

## 2022-11-08 DIAGNOSIS — Z00.00 PHYSICAL EXAM, ANNUAL: Primary | ICD-10-CM

## 2022-11-08 DIAGNOSIS — F32.A DEPRESSIVE DISORDER: ICD-10-CM

## 2022-11-08 PROCEDURE — 3074F SYST BP LT 130 MM HG: CPT | Performed by: INTERNAL MEDICINE

## 2022-11-08 PROCEDURE — 3078F DIAST BP <80 MM HG: CPT | Performed by: INTERNAL MEDICINE

## 2022-11-08 PROCEDURE — 99396 PREV VISIT EST AGE 40-64: CPT | Performed by: INTERNAL MEDICINE

## 2022-11-08 PROCEDURE — 3008F BODY MASS INDEX DOCD: CPT | Performed by: INTERNAL MEDICINE

## 2022-11-08 RX ORDER — FLUOXETINE 10 MG/1
10 CAPSULE ORAL DAILY
Qty: 90 CAPSULE | Refills: 1 | Status: SHIPPED | OUTPATIENT
Start: 2022-11-08

## 2022-12-08 ENCOUNTER — MED REC SCAN ONLY (OUTPATIENT)
Dept: INTERNAL MEDICINE CLINIC | Facility: CLINIC | Age: 57
End: 2022-12-08

## 2023-03-20 ENCOUNTER — OFFICE VISIT (OUTPATIENT)
Facility: CLINIC | Age: 58
End: 2023-03-20

## 2023-03-20 VITALS — HEART RATE: 93 BPM | SYSTOLIC BLOOD PRESSURE: 139 MMHG | DIASTOLIC BLOOD PRESSURE: 93 MMHG

## 2023-03-20 DIAGNOSIS — K57.30 DIVERTICULOSIS OF COLON: Primary | ICD-10-CM

## 2023-03-20 DIAGNOSIS — R93.3 ABNORMAL CT SCAN, COLON: ICD-10-CM

## 2023-04-29 DIAGNOSIS — F32.A DEPRESSIVE DISORDER: ICD-10-CM

## 2023-05-01 RX ORDER — FLUOXETINE 10 MG/1
10 CAPSULE ORAL EVERY MORNING
Qty: 90 CAPSULE | Refills: 3 | Status: SHIPPED | OUTPATIENT
Start: 2023-05-01

## 2023-05-02 NOTE — TELEPHONE ENCOUNTER
Refill passed per [place] [employer] protocol.     .  Requested Prescriptions   Pending Prescriptions Disp Refills    FLUOXETINE 10 MG Oral Cap [Pharmacy Med Name: FLUoxetine HCl Oral Capsule 10 MG] 90 capsule 0     Sig: TAKE ONE CAPSULE BY MOUTH DAILY IN THE MORNING       Psychiatric Non-Scheduled (Anti-Anxiety) Passed - 4/29/2023  9:33 AM        Passed - In person appointment or virtual visit in the past 6 mos or appointment in next 3 mos     Recent Outpatient Visits              1 month ago Diverticulosis of colon    Tommie Tran Jamas Jews, MD    Office Visit    5 months ago [facility]    [facility]    6 months ago Left lower quadrant pain    [facility], [facility], [facility]    Office Visit    1 year ago Left lower quadrant pain    [facility], [address] [place] [address], [facility]    Telemedicine    1 year ago Preop exam for internal medicine    Kely Tran MD    Office Visit                           Recent Outpatient Visits              1 month ago Diverticulosis of colon    Tommie Tran Jamas Jews, MD    Office Visit    5 months ago Physical exam, annual    [facility], Sergio Hardin MD    Office Visit    6 months ago Left lower quadrant pain    [facility] [facility], [facility], [facility]    Office Visit    1 year ago Left lower quadrant pain    [facility], [address] [place] [address], APR    Telemedicine    1 year ago Preop exam for internal medicine    Kely Tran MD    Office Visit

## 2023-06-29 ENCOUNTER — TELEPHONE (OUTPATIENT)
Facility: CLINIC | Age: 58
End: 2023-06-29

## 2023-07-05 ENCOUNTER — APPOINTMENT (OUTPATIENT)
Dept: CT IMAGING | Age: 58
End: 2023-07-05
Attending: PHYSICIAN ASSISTANT
Payer: COMMERCIAL

## 2023-07-05 ENCOUNTER — HOSPITAL ENCOUNTER (OUTPATIENT)
Age: 58
Discharge: HOME | End: 2023-07-05
Attending: PHYSICIAN ASSISTANT
Payer: COMMERCIAL

## 2023-07-05 ENCOUNTER — APPOINTMENT (OUTPATIENT)
Dept: GENERAL RADIOLOGY | Age: 58
End: 2023-07-05
Attending: PHYSICIAN ASSISTANT
Payer: COMMERCIAL

## 2023-07-05 VITALS
HEART RATE: 68 BPM | RESPIRATION RATE: 18 BRPM | SYSTOLIC BLOOD PRESSURE: 115 MMHG | WEIGHT: 130 LBS | BODY MASS INDEX: 23.04 KG/M2 | OXYGEN SATURATION: 99 % | HEIGHT: 63 IN | DIASTOLIC BLOOD PRESSURE: 73 MMHG | TEMPERATURE: 98 F
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
## 2023-07-05 DIAGNOSIS — S69.92XA INJURY OF FINGER OF LEFT HAND, INITIAL ENCOUNTER: ICD-10-CM

## 2023-07-05 DIAGNOSIS — M25.512 ACUTE PAIN OF LEFT SHOULDER: ICD-10-CM

## 2023-07-05 DIAGNOSIS — S00.83XA CONTUSION OF FACE, INITIAL ENCOUNTER: ICD-10-CM

## 2023-07-05 PROCEDURE — 73030 X-RAY EXAM OF SHOULDER: CPT | Performed by: PHYSICIAN ASSISTANT

## 2023-07-05 PROCEDURE — 70450 CT HEAD/BRAIN W/O DYE: CPT | Performed by: PHYSICIAN ASSISTANT

## 2023-07-05 PROCEDURE — 70486 CT MAXILLOFACIAL W/O DYE: CPT | Performed by: PHYSICIAN ASSISTANT

## 2023-07-05 PROCEDURE — 99213 OFFICE O/P EST LOW 20 MIN: CPT | Performed by: PHYSICIAN ASSISTANT

## 2023-07-05 NOTE — DISCHARGE INSTRUCTIONS
You may take Tylenol, ibuprofen for pain. Would recommend applying ice to the left ring finger, keeping this elevated and monitor for worsening pain, discoloration of the finger    If shoulder pain persist greater than 1 week, you should be seen by your primary care provider or by orthopedics    Speak with your primary care provider in regards to an area of hypoattenuation which was seen on the CT scan. It is recommended to have a nonemergent, dedicated MRI of this area.

## 2023-07-05 NOTE — ED INITIAL ASSESSMENT (HPI)
Pt reports mechanical fall yesterday at 22:00 tripping over sidewalk and landing on left side. Hit head on pavement. +left 4th finger swelling and shoulder pain. Denies LOC. +nausea, dizziness.

## 2023-07-05 NOTE — ED QUICK NOTES
:   Patient left the IC in stable condition via PV to [facility] IC for CT. Instructed patient to go straight to facility. She verbalized understanding of instructions given.

## 2023-11-21 ENCOUNTER — TELEPHONE (OUTPATIENT)
Dept: INTERNAL MEDICINE CLINIC | Facility: CLINIC | Age: 58
End: 2023-11-21

## 2023-11-21 NOTE — TELEPHONE ENCOUNTER
Please review. Protocol failed / Has no protocol. Versium message sent to patient to schedule an office visit with PCP. Will also make a phone attempt.     Requested Prescriptions   Pending Prescriptions Disp Refills    VALACYCLOVIR 1 G Oral Tab [Pharmacy Med Name: valACYclovir HCl Oral Tablet 1 GM] 20 tablet 0     Sig: TAKE TWO TABLETS (2,000 MG) BY MOUTH EVERY 12 HOURS FOR 1 DAY       There is no refill protocol information for this order           Recent Outpatient Visits              8 months ago Diverticulosis of colon    [address] [facility], [facility],[address], Jody Stauffer MD    Office Visit    1 year ago Physical exam, annual    Cynthia Garcia MD    Office Visit    1 year ago Left lower quadrant pain    Evelia Garcia [place], APRN    Office Visit    1 year ago Left lower quadrant pain    [facility], [address] [place] [address] Petra AGUILAR Morrell    Telemedicine    2 years ago Preop exam for internal medicine    [address] [place] [address], Portillo Goldberg MD    Office Visit

## 2023-11-22 RX ORDER — VALACYCLOVIR HYDROCHLORIDE 1 G/1
2000 TABLET, FILM COATED ORAL EVERY 12 HOURS SCHEDULED
Qty: 4 TABLET | Refills: 3 | Status: SHIPPED | OUTPATIENT
Start: 2023-11-22

## 2024-02-09 ENCOUNTER — LAB ENCOUNTER (OUTPATIENT)
Dept: LAB | Facility: HOSPITAL | Age: 59
End: 2024-02-09
Attending: INTERNAL MEDICINE
Payer: COMMERCIAL

## 2024-02-09 DIAGNOSIS — Z00.00 GENERAL MEDICAL EXAMINATION: Primary | ICD-10-CM

## 2024-02-09 DIAGNOSIS — Z82.49 FAMILY HISTORY OF ISCHEMIC HEART DISEASE: ICD-10-CM

## 2024-02-09 DIAGNOSIS — R76.8 FALSE POSITIVE SEROLOGICAL TEST FOR SYPHILIS: ICD-10-CM

## 2024-02-09 LAB
ALBUMIN SERPL-MCNC: 4.3 G/DL (ref 3.2–4.8)
ALBUMIN/GLOB SERPL: 1.3 {RATIO} (ref 1–2)
ALP LIVER SERPL-CCNC: 66 U/L
ALT SERPL-CCNC: 27 U/L
ANION GAP SERPL CALC-SCNC: 4 MMOL/L (ref 0–18)
AST SERPL-CCNC: 23 U/L (ref ?–34)
BASOPHILS # BLD AUTO: 0.04 X10(3) UL (ref 0–0.2)
BASOPHILS NFR BLD AUTO: 0.6 %
BILIRUB SERPL-MCNC: 0.6 MG/DL (ref 0.3–1.2)
BILIRUB UR QL: NEGATIVE
BUN BLD-MCNC: 11 MG/DL (ref 9–23)
BUN/CREAT SERPL: 14.5 (ref 10–20)
CALCIUM BLD-MCNC: 8.9 MG/DL (ref 8.7–10.4)
CHLORIDE SERPL-SCNC: 108 MMOL/L (ref 98–112)
CHOLEST SERPL-MCNC: 214 MG/DL (ref ?–200)
CLARITY UR: CLEAR
CO2 SERPL-SCNC: 27 MMOL/L (ref 21–32)
COLOR UR: YELLOW
CREAT BLD-MCNC: 0.76 MG/DL
CRP SERPL HS-MCNC: 1.57 MG/L (ref ?–3)
CRP SERPL-MCNC: <0.4 MG/DL (ref ?–1)
DEPRECATED RDW RBC AUTO: 41.1 FL (ref 35.1–46.3)
EGFRCR SERPLBLD CKD-EPI 2021: 91 ML/MIN/1.73M2 (ref 60–?)
EOSINOPHIL # BLD AUTO: 0.21 X10(3) UL (ref 0–0.7)
EOSINOPHIL NFR BLD AUTO: 3.1 %
ERYTHROCYTE [DISTWIDTH] IN BLOOD BY AUTOMATED COUNT: 12.3 % (ref 11–15)
ERYTHROCYTE [SEDIMENTATION RATE] IN BLOOD: 9 MM/HR
FASTING PATIENT LIPID ANSWER: YES
FASTING STATUS PATIENT QL REPORTED: YES
GLOBULIN PLAS-MCNC: 3.3 G/DL (ref 2.8–4.4)
GLUCOSE BLD-MCNC: 87 MG/DL (ref 70–99)
GLUCOSE UR-MCNC: NORMAL MG/DL
HCT VFR BLD AUTO: 39 %
HDLC SERPL-MCNC: 52 MG/DL (ref 40–59)
HGB BLD-MCNC: 13 G/DL
IMM GRANULOCYTES # BLD AUTO: 0.01 X10(3) UL (ref 0–1)
IMM GRANULOCYTES NFR BLD: 0.1 %
KETONES UR-MCNC: NEGATIVE MG/DL
LDLC SERPL CALC-MCNC: 135 MG/DL (ref ?–100)
LEUKOCYTE ESTERASE UR QL STRIP.AUTO: 500
LYMPHOCYTES # BLD AUTO: 2.09 X10(3) UL (ref 1–4)
LYMPHOCYTES NFR BLD AUTO: 30.8 %
MCH RBC QN AUTO: 30.6 PG (ref 26–34)
MCHC RBC AUTO-ENTMCNC: 33.3 G/DL (ref 31–37)
MCV RBC AUTO: 91.8 FL
MONOCYTES # BLD AUTO: 0.7 X10(3) UL (ref 0.1–1)
MONOCYTES NFR BLD AUTO: 10.3 %
NEUTROPHILS # BLD AUTO: 3.74 X10 (3) UL (ref 1.5–7.7)
NEUTROPHILS # BLD AUTO: 3.74 X10(3) UL (ref 1.5–7.7)
NEUTROPHILS NFR BLD AUTO: 55.1 %
NITRITE UR QL STRIP.AUTO: NEGATIVE
NONHDLC SERPL-MCNC: 162 MG/DL (ref ?–130)
OSMOLALITY SERPL CALC.SUM OF ELEC: 287 MOSM/KG (ref 275–295)
PH UR: 6 [PH] (ref 5–8)
PLATELET # BLD AUTO: 322 10(3)UL (ref 150–450)
POTASSIUM SERPL-SCNC: 4 MMOL/L (ref 3.5–5.1)
PROT SERPL-MCNC: 7.6 G/DL (ref 5.7–8.2)
RBC # BLD AUTO: 4.25 X10(6)UL
SODIUM SERPL-SCNC: 139 MMOL/L (ref 136–145)
SP GR UR STRIP: 1.02 (ref 1–1.03)
TRIGL SERPL-MCNC: 154 MG/DL (ref 30–149)
TSI SER-ACNC: 2.41 MIU/ML (ref 0.55–4.78)
UROBILINOGEN UR STRIP-ACNC: NORMAL
VIT B12 SERPL-MCNC: 522 PG/ML (ref 211–911)
VLDLC SERPL CALC-MCNC: 28 MG/DL (ref 0–30)
WBC # BLD AUTO: 6.8 X10(3) UL (ref 4–11)
WBC #/AREA URNS AUTO: >50 /HPF
WBC #/AREA URNS AUTO: >50 /HPF

## 2024-02-09 PROCEDURE — 85025 COMPLETE CBC W/AUTO DIFF WBC: CPT

## 2024-02-09 PROCEDURE — 85652 RBC SED RATE AUTOMATED: CPT

## 2024-02-09 PROCEDURE — 86140 C-REACTIVE PROTEIN: CPT

## 2024-02-09 PROCEDURE — 86141 C-REACTIVE PROTEIN HS: CPT

## 2024-02-09 PROCEDURE — 84443 ASSAY THYROID STIM HORMONE: CPT

## 2024-02-09 PROCEDURE — 80061 LIPID PANEL: CPT

## 2024-02-09 PROCEDURE — 80053 COMPREHEN METABOLIC PANEL: CPT

## 2024-02-09 PROCEDURE — 83695 ASSAY OF LIPOPROTEIN(A): CPT

## 2024-02-09 PROCEDURE — 36415 COLL VENOUS BLD VENIPUNCTURE: CPT

## 2024-02-09 PROCEDURE — 82607 VITAMIN B-12: CPT

## 2024-02-09 PROCEDURE — 81001 URINALYSIS AUTO W/SCOPE: CPT

## 2024-02-09 PROCEDURE — 81015 MICROSCOPIC EXAM OF URINE: CPT

## 2024-02-12 LAB — LIPOPROTEIN (A): 25.3 NMOL/L

## 2024-05-07 ENCOUNTER — HOSPITAL ENCOUNTER (OUTPATIENT)
Age: 59
Discharge: HOME | End: 2024-05-07
Payer: COMMERCIAL

## 2024-05-07 ENCOUNTER — APPOINTMENT (OUTPATIENT)
Dept: GENERAL RADIOLOGY | Age: 59
End: 2024-05-07
Attending: NURSE PRACTITIONER
Payer: COMMERCIAL

## 2024-05-07 VITALS
SYSTOLIC BLOOD PRESSURE: 144 MMHG | HEART RATE: 77 BPM | TEMPERATURE: 98 F | OXYGEN SATURATION: 100 % | RESPIRATION RATE: 20 BRPM | DIASTOLIC BLOOD PRESSURE: 85 MMHG

## 2024-05-07 DIAGNOSIS — R06.2 WHEEZING: ICD-10-CM

## 2024-05-07 DIAGNOSIS — Z20.822 LAB TEST NEGATIVE FOR COVID-19 VIRUS: ICD-10-CM

## 2024-05-07 DIAGNOSIS — B97.89 VIRAL RESPIRATORY ILLNESS: Primary | ICD-10-CM

## 2024-05-07 DIAGNOSIS — J98.8 VIRAL RESPIRATORY ILLNESS: Primary | ICD-10-CM

## 2024-05-07 LAB
POCT INFLUENZA A: NEGATIVE
POCT INFLUENZA B: NEGATIVE
SARS-COV-2 RNA RESP QL NAA+PROBE: NOT DETECTED

## 2024-05-07 PROCEDURE — U0002 COVID-19 LAB TEST NON-CDC: HCPCS | Performed by: NURSE PRACTITIONER

## 2024-05-07 PROCEDURE — 94640 AIRWAY INHALATION TREATMENT: CPT | Performed by: NURSE PRACTITIONER

## 2024-05-07 PROCEDURE — 87502 INFLUENZA DNA AMP PROBE: CPT | Performed by: NURSE PRACTITIONER

## 2024-05-07 PROCEDURE — 99214 OFFICE O/P EST MOD 30 MIN: CPT | Performed by: NURSE PRACTITIONER

## 2024-05-07 PROCEDURE — 71046 X-RAY EXAM CHEST 2 VIEWS: CPT | Performed by: NURSE PRACTITIONER

## 2024-05-07 RX ORDER — IPRATROPIUM BROMIDE AND ALBUTEROL SULFATE 2.5; .5 MG/3ML; MG/3ML
3 SOLUTION RESPIRATORY (INHALATION) ONCE
Status: COMPLETED | OUTPATIENT
Start: 2024-05-07 | End: 2024-05-07

## 2024-05-07 RX ORDER — ALBUTEROL SULFATE 90 UG/1
2 AEROSOL, METERED RESPIRATORY (INHALATION) EVERY 4 HOURS PRN
Qty: 1 EACH | Refills: 0 | Status: SHIPPED | OUTPATIENT
Start: 2024-05-07 | End: 2024-06-06

## 2024-05-07 RX ORDER — BENZONATATE 200 MG/1
200 CAPSULE ORAL 3 TIMES DAILY PRN
Qty: 21 CAPSULE | Refills: 0 | Status: SHIPPED | OUTPATIENT
Start: 2024-05-07

## 2024-05-07 NOTE — ED PROVIDER NOTES
Patient Seen in: [facility]      History     Chief Complaint   Patient presents with    Cough     Entered by patient     Stated Complaint: Cough    Subjective:   HPI    This is a 58-year-old female with history of depression, breast cancer with bilateral mastectomy, asthma presenting with a wet cough.  Patient states that she recently traveled and while she was out of state which was a few days ago she developed a wet sounding cough.  Patient states many years ago she had pneumonia but because of the way the cough sounds and when she coughs she feels short of breath just with coughing she wanted to be evaluated.  Denies any fever chest pain nausea vomiting or diarrhea.   No COVID testing at home.    Objective:   No pertinent past medical history.            No pertinent past surgical history.              No pertinent social history.            Review of Systems    Positive for stated complaint: Cough  Other systems are as noted in HPI.  Constitutional and vital signs reviewed.      All other systems reviewed and negative except as noted above.    Physical Exam     ED Triage Vitals [05/07/24 1836]   /85   Pulse 77   Resp 20   Temp 97.6 °F (36.4 °C)   Temp src Temporal   SpO2 100 %   O2 Device None (Room air)       Current Vitals:   Vital Signs  BP: 144/85  Pulse: 77  Resp: 20  Temp: 97.6 °F (36.4 °C)  Temp src: Temporal    Oxygen Therapy  SpO2: 100 %  O2 Device: None (Room air)            Physical Exam  Vitals and nursing note reviewed.   Constitutional:       Appearance: Normal appearance.   HENT:      Right Ear: Tympanic membrane normal.      Left Ear: Tympanic membrane normal.      Nose: Congestion present. No rhinorrhea.      Mouth/Throat:      Mouth: Mucous membranes are moist.      Pharynx: Oropharynx is clear. No posterior oropharyngeal erythema.   Eyes:      Conjunctiva/sclera: Conjunctivae normal.   Cardiovascular:      Rate and Rhythm: Normal rate.   Pulmonary:      Effort: Pulmonary  effort is normal. No respiratory distress.      Breath sounds: Wheezing present.      Comments: + coughing during exam  Musculoskeletal:         General: Normal range of motion.      Cervical back: Normal range of motion.   Neurological:      General: No focal deficit present.      Mental Status: She is alert and oriented to person, place, and time.               ED Course     Labs Reviewed   POCT FLU TEST - Normal    Narrative:     This assay is a rapid molecular in vitro test utilizing nucleic acid amplification of influenza A and B viral RNA.   RAPID SARS-COV-2 BY PCR - Normal        XR CHEST PA + LAT CHEST (CPT=71046)    Result Date: 5/7/2024  CONCLUSION:  No acute cardiopulmonary process.    Dictated by (CST): Av De Los Santos MD on 5/07/2024 at 7:25 PM     Finalized by (CST): Av De Los Santos MD on 5/07/2024 at 7:26 PM                       MDM             Medical Decision Making  58-year-old female nontoxic-appearing coughing during exam with wheezing with cough cold symptoms for a few days and recent travel.  No clinical indication for labs but a chest x-ray will be ordered due to the wheezing and COVID and flu swabs.  Patient is agreeable with this plan of care.    Influenza and COVID-negative chest x-ray independently viewed by this provider no pneumonia.    Duoneb ordered due to COVID and flu being negative for the wheezing.  On reevaluation after the neb lung sounds are clear no hypoxia or distress patient speaking in full sentences.  Discussed all results with the patient.  Discussed likely a respiratory viral illness.  Discussed prescribing benzonatate and albuterol inhaler to help with symptoms.  Discussed pushing fluids ibuprofen or Tylenol for pain or discomfort.  Discussed strict ER precautions with any new or worsening symptoms and outpatient follow-up with PCP.  Patient feels comfortable this plan of care and acknowledges understanding discharge instructions.    Problems Addressed:  Viral  respiratory illness: acute illness or injury  Wheezing: acute illness or injury    Amount and/or Complexity of Data Reviewed  Labs:  Decision-making details documented in ED Course.  Radiology:  Decision-making details documented in ED Course.    Risk  OTC drugs.  Prescription drug management.        Disposition and Plan     Clinical Impression:  1. Viral respiratory illness    2. Wheezing    3. Lab test negative for COVID-19 virus         Disposition:  Discharge  5/7/2024  7:29 pm    Follow-up:  Gaye Ziegler MD  [address] [place] [id number]  [phone]    Schedule an appointment as soon as possible for a visit in 3 days            Medications Prescribed:  Discharge Medication List as of 5/7/2024  7:31 PM        START taking these medications    Details   benzonatate 200 MG Oral Cap Take 1 capsule (200 mg total) by mouth 3 (three) times daily as needed for cough., Normal, Disp-21 capsule, R-0      albuterol 108 (90 Base) MCG/ACT Inhalation Aero Soln Inhale 2 puffs into the lungs every 4 (four) hours as needed for Wheezing or Shortness of Breath., Normal, Disp-1 each, R-0

## 2024-05-08 NOTE — DISCHARGE INSTRUCTIONS
Use the Inhaler as needed to help with wheezing take the benzonatate for the cough.  Drink plenty of fluids warm tea with honey you may take ibuprofen and pain or discomfort.  Follow-up with your primary care provider 2 to 3 days.  If you develop worsening cough develop chest pain or feel like shortness of breath is worse at rest or with movement develop nausea vomiting diarrhea lightheadedness dizziness or any new or worsening symptoms go to the nearest emergency department.

## 2025-02-03 ENCOUNTER — TELEPHONE (OUTPATIENT)
Facility: CLINIC | Age: 60
End: 2025-02-03

## 2025-02-07 NOTE — TELEPHONE ENCOUNTER
Miguel Nava MD   Physician  Gastroenterology     Operative Report  Signed     Date of Service: 1/14/2020  8:58 AM  Case Time: Procedures: Surgeons:   1/14/2020  9:17 AM COLONOSCOPY    Miguel Nava MD               Signed         COLONOSCOPY PROCEDURE REPORT     DATE OF PROCEDURE:  1/14/2020      PCP: Joseph Fink MD     PREOPERATIVE DIAGNOSIS: Screening colonoscopy examination     POSTOPERATIVE DIAGNOSIS:  See impression.     SURGEON:  Miguel Nava M.D.     SEDATION:    MAC anesthesia provided by the Anesthesia Service.  MAC anesthesia requested due to history of fluoxetine use, anticipated intolerance of colonoscopy examination under safe doses conscious sedation medications     COLONOSCOPY PROCEDURE:   After the nature and risks of colonoscopy examination under MAC sedation were discussed with the patient and all questions answered, informed consent was obtained.  The patient was sedated as above.       Digital rectal exam was performed which showed no masses.  The Olympus pediatric video colonoscope was placed in the patient's rectum and advanced under direct visualization through the entire length of the colon up to the cecum and terminal ileum.  Retroflex exam performed up the ascending colon.  The cecum was confirmed by landmarks including appendiceal orifice, cecal trifold, ileocecal valve.  Retroflexion was performed in the rectum.     The quality of the prep was excellent.     COLONOSCOPY FINDINGS:    Moderate severity diverticulosis descending and sigmoid colon.  Small internal hemorrhoids.     RECOMMENDATIONS:  High fiber diet.  Repeat colonoscopy examination or other colon cancer screening in 7-10 years                  Electronically signed by Miguel Nava MD at 1/14/2020  9:51 AM

## 2025-02-12 ENCOUNTER — OFFICE VISIT (OUTPATIENT)
Dept: INTERNAL MEDICINE CLINIC | Facility: CLINIC | Age: 60
End: 2025-02-12
Payer: COMMERCIAL

## 2025-02-12 VITALS
DIASTOLIC BLOOD PRESSURE: 85 MMHG | WEIGHT: 139.19 LBS | SYSTOLIC BLOOD PRESSURE: 138 MMHG | BODY MASS INDEX: 24.66 KG/M2 | HEIGHT: 63 IN

## 2025-02-12 DIAGNOSIS — K21.9 GASTROESOPHAGEAL REFLUX DISEASE, UNSPECIFIED WHETHER ESOPHAGITIS PRESENT: ICD-10-CM

## 2025-02-12 DIAGNOSIS — Z00.00 PHYSICAL EXAM, ANNUAL: Primary | ICD-10-CM

## 2025-02-12 PROCEDURE — 3079F DIAST BP 80-89 MM HG: CPT | Performed by: INTERNAL MEDICINE

## 2025-02-12 PROCEDURE — 3075F SYST BP GE 130 - 139MM HG: CPT | Performed by: INTERNAL MEDICINE

## 2025-02-12 PROCEDURE — 99396 PREV VISIT EST AGE 40-64: CPT | Performed by: INTERNAL MEDICINE

## 2025-02-12 PROCEDURE — 3008F BODY MASS INDEX DOCD: CPT | Performed by: INTERNAL MEDICINE

## 2025-02-12 RX ORDER — CHLORAL HYDRATE 500 MG
CAPSULE ORAL
COMMUNITY

## 2025-02-12 RX ORDER — CALCIUM CARBONATE/VITAMIN D3 500 MG-2.5
TABLET,CHEWABLE ORAL
COMMUNITY
Start: 2023-01-01

## 2025-02-12 NOTE — TELEPHONE ENCOUNTER
I spoke to the pt and reviewed Dr Nava's below recommendations with her.    She voices understanding and is ok with waiting until the 7-10 year livia for her next colonoscopy    New recall placed an Health maintenance updated

## 2025-02-12 NOTE — PATIENT INSTRUCTIONS
Eating for your heart doesn’t have to be hard or boring. You just need to know how to make healthier choices. The DASH eating plan has been developed to help you do just that. DASH stands for Dietary Approaches to Stop Hypertension. It is a plan that has been proven to be healthier for your heart and to lower your risk for high blood pressure. It can also help lower your risk for cancer, heart disease, osteoporosis, and diabetes.  Choosing from each food group  Choose foods from each of the food groups below each day. Try to get the recommended number of servings for each food group. The serving numbers are based on a diet of 2,000 calories a day. Talk to your doctor if you’re unsure about your calorie needs. Along with getting the correct servings, the DASH plan also recommends a sodium intake less than 2,300 mg per day.        Grains  Servings: 6 to 8 a day  A serving is:  1 slice bread  1 ounce dry cereal  Half a cup cooked rice, pasta or cereal  Best choices: Whole grains and any grains high in fiber. Vegetables  Servings: 4 to 5 a day  A serving is:  1 cup raw leafy vegetable  Half a cup cut-up raw or cooked vegetable  Half a cup vegetable juice  Best choices: Fresh or frozen vegetables prepared without added salt or fat.   Fruits  Servings: 4 to 5 a day  A serving is:  1 medium fruit  One-quarter cup dried fruit  Half a cup fresh, frozen, or canned fruit  Half a cup of 100% fruit juices  Best choices: A variety of fresh fruits of different colors. Whole fruits are a better choice than fruit juices. Low-fat or fat-free dairy  Servings: 2 to 3 a day  A serving is:  1 cup milk  1 cup yogurt  One and a half ounces cheese  Best choices: Skim or 1% milk, low-fat or fat-free yogurt or buttermilk, and low-fat cheeses.         Lean meats, poultry, fish  Servings: 6 or fewer a day  A serving is:  1 ounce cooked meats, poultry, or fish  1 egg  Best choices: Lean poultry and fish. Trim away visible fat. Broil, grill,  roast, or boil instead of frying. Remove skin from poultry before eating. Limit how much red meat you eat.  Nuts, seeds, beans  Servings: 4 to 5 a week  A serving is:  One-third cup nuts (one and a half ounces)  2 tablespoons nut butter or seeds  Half a cup cooked dry beans or legumes  Best choices: Dry roasted nuts with no salt added, lentils, kidney beans, garbanzo beans, and whole salgado beans.   Fats and oilsLow-Salt Choices  Eating salt (sodium) can make your body retain too much water. Excess water makes your heart work harder. Canned, packaged, and frozen foods are easy to prepare. But they are often high in sodium. Here are some ideas for low-salt foods you can easily make yourself.  For breakfast  Fruit or 100% fruit juice  Whole-wheat bread or an English muffin. Look for sodium content on Nutrition Facts labels.  Low-fat milk or yogurt  Unsalted eggs  Shredded wheat  Corn tortillas  Unsalted steamed rice  Regular (not instant) hot cereal, made without salt  Stay away from:  Sausage, ku, and ham  Flour tortillas  Packaged muffins, pancakes, and biscuits  Instant hot cereals  Cottage cheese  For lunch and dinner  Fresh fish, chicken, turkey, or meat--baked, broiled, or roasted without salt  Dry beans, cooked without salt  Tofu, stir-fried without salt  Unsalted fresh fruit and vegetables, or frozen or canned fruit and vegetables with no added salt  Stay away from:  Lunch or deli meat that is cured or smoked  Cheese  Tomato juice and ketchup  Canned vegetables, soups, and fish not labeled as no-salt-added or reduced sodium  Packaged gravies and sauces  Olives, pickles, and relish  Bottled salad dressings  For snacks and desserts  Yogurt  Unsalted, air-popped popcorn  Unsalted nuts or seeds  Stay away from:  Pies and cakes  Packaged dessert mixes  Pizza  Canned and packaged puddings  Pretzels, chips, crackers, and nuts--unless the label says unsalted  Date Last Reviewed: 6/1/2017  © 3178-1069 [facility]  [employer]. [address] [place] [address]. All rights reserved. This information is not intended as a substitute for professional medical care. Always follow your healthcare professional's instructions.   Servings: 2 to 3 a day  A serving is:  1 teaspoon vegetable oil  1 teaspoon soft margarine  1 tablespoon mayonnaise  2 tablespoons salad dressing  Best choices: Nut and vegetable oils (nontropical vegetable oils), such as olive and canola oil. Sweets  Servings: 5 a week or fewer  A serving is:  1 tablespoon sugar, maple syrup, or honey  1 tablespoon jam or jelly  1 half-ounce jelly beans (about 15)  1 cup lemonade  Best choices: Dried fruit can be a satisfying sweet. Choose low-fat sweets. And watch your serving sizes!      For more on the DASH eating plan, visit:  www.nhlbi.nih.gov/health/health-topics/topics/dash   Date Last Reviewed: 6/1/2016  © 4360-9807 [employer] [address] [place] [address]. All rights reserved. This information is not intended as a substitute for professional medical care. Always follow your healthcare professional's instructions.

## 2025-02-12 NOTE — TELEPHONE ENCOUNTER
Dr Nava,    1) Colonoscopy was 01/14/2020, op-report recommends repeat in 7-10 years    2) Office visit was 03/20/2023:    Colon cancer screening     Next colonoscopy examination previously recommended for January 2027 - January 2030    =============================    Please confirm [id number]  is when the pt should repeat the colonoscopy. She thought she was due now.    Pt is not having any issues and is ok to wait if you do not think she needs to repeat the colonoscopy at the 5 year livia

## 2025-02-12 NOTE — TELEPHONE ENCOUNTER
Thank you so much Jacobo.    Looks like we had a comprehensive follow-up visit 3/20/2023.  She is a breast cancer survivor, known to me from the colonoscopy examinations of August 2014 and January 2020; no colon polyps either exam.    She has suffered recurrent colonic diverticulitis about twice per year.    All of my notes have the recommendation for the 7-10-year follow-up which I think is reasonable and appropriate.  Each colonoscopy carries risks of anesthesia complications, bleeding injury even provoking the diverticulitis so we do not want to perform these too often.  She could always follow-up with me in the office if she would like to discuss.      - cb

## 2025-02-12 NOTE — TELEPHONE ENCOUNTER
Recall colon in 7 years per Dr Nava. Colon done 01/14/2020    Health maintenance updated and message sent to pt outreach to repeat colonoscopy in 7 years

## 2025-03-10 ENCOUNTER — PATIENT MESSAGE (OUTPATIENT)
Dept: INTERNAL MEDICINE CLINIC | Facility: CLINIC | Age: 60
End: 2025-03-10

## 2025-03-10 DIAGNOSIS — Z01.84 IMMUNITY TO MEASLES, MUMPS, AND RUBELLA DETERMINED BY SEROLOGIC TEST: Primary | ICD-10-CM

## 2025-04-23 ENCOUNTER — LAB ENCOUNTER (OUTPATIENT)
Dept: LAB | Age: 60
End: 2025-04-23
Attending: INTERNAL MEDICINE
Payer: COMMERCIAL

## 2025-04-23 ENCOUNTER — APPOINTMENT (OUTPATIENT)
Dept: LAB | Age: 60
End: 2025-04-23
Attending: INTERNAL MEDICINE
Payer: COMMERCIAL

## 2025-04-23 DIAGNOSIS — Z01.84 IMMUNITY TO MEASLES, MUMPS, AND RUBELLA DETERMINED BY SEROLOGIC TEST: ICD-10-CM

## 2025-04-23 DIAGNOSIS — Z00.00 PHYSICAL EXAM, ANNUAL: ICD-10-CM

## 2025-04-23 LAB
ALBUMIN SERPL-MCNC: 4.4 G/DL (ref 3.2–4.8)
ALBUMIN/GLOB SERPL: 1.4 {RATIO} (ref 1–2)
ALP LIVER SERPL-CCNC: 60 U/L (ref 46–118)
ALT SERPL-CCNC: 14 U/L (ref 10–49)
ANION GAP SERPL CALC-SCNC: 6 MMOL/L (ref 0–18)
AST SERPL-CCNC: 19 U/L (ref ?–34)
BILIRUB SERPL-MCNC: 0.5 MG/DL (ref 0.3–1.2)
BUN BLD-MCNC: 15 MG/DL (ref 9–23)
BUN/CREAT SERPL: 18.5 (ref 10–20)
CALCIUM BLD-MCNC: 8.8 MG/DL (ref 8.7–10.4)
CHLORIDE SERPL-SCNC: 105 MMOL/L (ref 98–112)
CHOLEST SERPL-MCNC: 208 MG/DL (ref ?–200)
CO2 SERPL-SCNC: 29 MMOL/L (ref 21–32)
CREAT BLD-MCNC: 0.81 MG/DL (ref 0.55–1.02)
DEPRECATED RDW RBC AUTO: 42.5 FL (ref 35.1–46.3)
EGFRCR SERPLBLD CKD-EPI 2021: 84 ML/MIN/1.73M2 (ref 60–?)
ERYTHROCYTE [DISTWIDTH] IN BLOOD BY AUTOMATED COUNT: 12.3 % (ref 11–15)
FASTING PATIENT LIPID ANSWER: YES
FASTING STATUS PATIENT QL REPORTED: YES
GLOBULIN PLAS-MCNC: 3.1 G/DL (ref 2–3.5)
GLUCOSE BLD-MCNC: 87 MG/DL (ref 70–99)
HCT VFR BLD AUTO: 38.6 % (ref 35–48)
HDLC SERPL-MCNC: 50 MG/DL (ref 40–59)
HGB BLD-MCNC: 13.1 G/DL (ref 12–16)
LDLC SERPL CALC-MCNC: 132 MG/DL (ref ?–100)
MCH RBC QN AUTO: 31.7 PG (ref 26–34)
MCHC RBC AUTO-ENTMCNC: 33.9 G/DL (ref 31–37)
MCV RBC AUTO: 93.5 FL (ref 80–100)
NONHDLC SERPL-MCNC: 158 MG/DL (ref ?–130)
OSMOLALITY SERPL CALC.SUM OF ELEC: 290 MOSM/KG (ref 275–295)
PLATELET # BLD AUTO: 316 10(3)UL (ref 150–450)
POTASSIUM SERPL-SCNC: 4.5 MMOL/L (ref 3.5–5.1)
PROT SERPL-MCNC: 7.5 G/DL (ref 5.7–8.2)
RBC # BLD AUTO: 4.13 X10(6)UL (ref 3.8–5.3)
SODIUM SERPL-SCNC: 140 MMOL/L (ref 136–145)
TRIGL SERPL-MCNC: 146 MG/DL (ref 30–149)
TSI SER-ACNC: 1.78 UIU/ML (ref 0.55–4.78)
VLDLC SERPL CALC-MCNC: 27 MG/DL (ref 0–30)
WBC # BLD AUTO: 6.6 X10(3) UL (ref 4–11)

## 2025-04-23 PROCEDURE — 86765 RUBEOLA ANTIBODY: CPT

## 2025-04-23 PROCEDURE — 84443 ASSAY THYROID STIM HORMONE: CPT

## 2025-04-23 PROCEDURE — 80061 LIPID PANEL: CPT

## 2025-04-23 PROCEDURE — 85027 COMPLETE CBC AUTOMATED: CPT

## 2025-04-23 PROCEDURE — 80053 COMPREHEN METABOLIC PANEL: CPT

## 2025-04-23 PROCEDURE — 36415 COLL VENOUS BLD VENIPUNCTURE: CPT

## 2025-04-25 LAB — MEV IGG SER-ACNC: 22.5 AU/ML (ref 16.5–?)

## 2025-07-13 ENCOUNTER — HOSPITAL ENCOUNTER (EMERGENCY)
Facility: HOSPITAL | Age: 60
Discharge: LEFT BEFORE DISCHARGE | End: 2025-07-13
Payer: COMMERCIAL

## (undated) DEVICE — CLIP MED INTNL HMCLP TNTLM

## (undated) DEVICE — Device: Brand: DEFENDO AIR/WATER/SUCTION AND BIOPSY VALVE

## (undated) DEVICE — HEX-LOCKING BLADE ELECTRODE: Brand: EDGE

## (undated) DEVICE — STERILE LATEX POWDER-FREE SURGICAL GLOVESWITH NITRILE COATING: Brand: PROTEXIS

## (undated) DEVICE — DRAPE PACK CHEST & U BAR

## (undated) DEVICE — UNDYED BRAIDED (POLYGLACTIN 910), SYNTHETIC ABSORBABLE SUTURE: Brand: COATED VICRYL

## (undated) DEVICE — CLIP SM INTNL HMCLP TNTLM ESCP

## (undated) DEVICE — OUTPATIENT: Brand: MEDLINE INDUSTRIES, INC.

## (undated) DEVICE — LINE MNTR ADLT SET O2 INTMD

## (undated) DEVICE — Device: Brand: CUSTOM PROCEDURE KIT

## (undated) DEVICE — MEDI-VAC NON-CONDUCTIVE SUCTION TUBING 6MM X 1.8M (6FT.) L: Brand: CARDINAL HEALTH

## (undated) DEVICE — SOL  .9 1000ML BTL

## (undated) DEVICE — ABDOMINAL PAD: Brand: CURITY

## (undated) DEVICE — SUTURE SILK 2-0 FS

## (undated) DEVICE — CONTAINER SPEC STR 4OZ GRY LID

## (undated) DEVICE — SUTURE SILK 4-0 P-3

## (undated) DEVICE — DRAPE SRG 50X36IN HD TRBN STRL

## (undated) DEVICE — 3M™ STERI-STRIP™ REINFORCED ADHESIVE SKIN CLOSURES, R1547, 1/2 IN X 4 IN (12 MM X 100 MM), 6 STRIPS/ENVELOPE: Brand: 3M™ STERI-STRIP™

## (undated) DEVICE — DRAPE SHEET LG

## (undated) DEVICE — GAUZE SPONGES,12 PLY: Brand: CURITY

## (undated) DEVICE — SUTURE VICRYL 3-0 SH

## (undated) DEVICE — DRAPE TAPE: Brand: CONVERTORS

## (undated) DEVICE — SUTURE MONOCRYL 4-0 PS-2

## (undated) DEVICE — BRA SURG ELIZ PINK S

## (undated) DEVICE — ADHESIVE MASTISOL 2/3CC VL

## (undated) DEVICE — 6 ML SYRINGE LUER-LOCK TIP: Brand: MONOJECT

## (undated) DEVICE — GAMMEX® PI HYBRID SIZE 7, STERILE POWDER-FREE SURGICAL GLOVE, POLYISOPRENE AND NEOPRENE BLEND: Brand: GAMMEX

## (undated) DEVICE — MINOR GENERAL: Brand: MEDLINE INDUSTRIES, INC.

## (undated) DEVICE — FLEXIBLE YANKAUER,MEDIUM TIP, NO VACUUM CONTROL: Brand: ARGYLE

## (undated) DEVICE — 35 ML SYRINGE REGULAR TIP: Brand: MONOJECT

## (undated) DEVICE — SUTURE ETHILON 5-0 698G

## (undated) DEVICE — Device: Brand: JELCO

## (undated) DEVICE — PEN: MARKING STD PT 100/CS: Brand: MEDICAL ACTION INDUSTRIES

## (undated) DEVICE — ENCORE® PERRY STYLE 42 PF SIZE 6.5, STERILE LATEX POWDER-FREE SURGICAL GLOVE: Brand: ENCORE

## (undated) DEVICE — 12 ML SYRINGE LUER-LOCK TIP: Brand: MONOJECT

## (undated) NOTE — LETTER
18      Patient: Jessica Bains  : 9/3/1965 Visit date: 2018    Dear Marcia Deluca,      I examined your patient in consultation today. 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 We will plan on wide excision.     Yves Victoria

## (undated) NOTE — LETTER
[address]  [facility] [address], [place], [place]  [address]  INFORMED CONSENT FOR TRANSFUSION OF BLOOD OR BLOOD PRODUCTS  My physician has informed me of the nature, purpose, benefits and risks of transfusion for blood and blood components that ______________________________________________  (Signature of Patient)                                                            (Responsible party in case of Minor,

## (undated) NOTE — LETTER
[address]  [address] [place], [place]      Authorization for Surgical Operation and Procedure     Date:___________                                                                                                         Time:_______ blood transfusion and/or blood products.   The following are some, but not all, of the potential risks that can occur: fever and allergic reactions, hemolytic reactions, transmission of diseases such as Hepatitis, AIDS and Cytomegalovirus (CMV) and fluid ov a person authorized to consent on my behalf). The surgeon or my attending physician will determine when the applicable recovery period ends for purposes of reinstating the DNAR order.   10. Patients having a sterilization procedure: I understand that if the procedure/surgery, I have disclosed this and had a discussion with my patient.     _______________________________________________________________ _____________________________  Salomón Angeler of Physician)

## (undated) NOTE — LETTER
7/16/2019    Iveth Covington        [address] [place] [address]            Dear Iveth Covington,      Our records indicate that you are due for an appointment for a Colonoscopy in August 2019, or shortly there after, with Rosita

## (undated) NOTE — LETTER
01/20/21        Iveth Covington  [address] [facility]      Dear Sathya Perkins,    [facility] records indicate that you have outstanding lab work and or testing that was ordered for you and has not yet been completed:    CARD ECHO STRESS ECHO/REST

## (undated) NOTE — LETTER
[address], [place]  Authorization for Invasive Procedures  1.  I hereby authorize Dr. Justina Montalvo , my physician and whomever may be designated as the doctor's assistant, to perform the following operation and/or procedure:  Ultra risks that can occur: fever and allergic reactions, hemolytic reactions, transmission of disease such as hepatitis, AIDS, cytomegalovirus (CMV), and flluid overload.  In the event that I wish to have autologous transfusions of my own blood, or a directed do of my physician.      Signature of Patient:  ________________________________________________ Date: _________Time: _________    Responsible person in case of minor or unconscious: _____________________________Relationship: ____________     Witness Signature

## (undated) NOTE — LETTER
October 11, 2018     Allen Reynaga  [address] [facility]      Dear Eliezer Beth:  Mammogram results are normal. Please repeat mammogram in 1 year    Below are the results from your recent visit:    Resulted Orders   NICK MARIO 2D+3D SCRE Sycuan = Skin tag or mole  BB = Nipple  Linear livia = Scar   Square = Pain               Dictated by (CST): Elsi Reyna MD on 10/06/2018 at 9:05       Approved by (CST): Elsi Reyna MD on 10/06/2018 at 9:09               If you have

## (undated) NOTE — LETTER
[facility]  Administration of Anesthesia  1.  Wei Dunn, or _________________________________ acting on her behalf, (Patient) (Dependent/Representative) request to receive anesthesia for my pending procedure/operation/treatment spinal bleeding, seizure, cardiac arrest and death. 7. AWARENESS: I understand that it is possible (but unlikely) to have explicit memory of events from the operating room while under general anesthesia.   8. ELECTROCONVULSIVE THERAPY PATIENTS: This consen signature below affirms that prior to the time of the procedure, I have explained to the patient and/or his/her guardian, the risks and benefits of undergoing anesthesia, as well as any reasonable alternatives.     __________________________________________

## (undated) NOTE — LETTER
[address] [place] [address]      Authorization for Surgical Operation and Procedure     Date:___________                                                                                                         Time:_______ all, of the potential risks that can occur: fever and allergic reactions, hemolytic reactions, transmission of diseases such as Hepatitis, AIDS and Cytomegalovirus (CMV) and fluid overload.   In the event that I wish to have an autologous transfusion of my physician will determine when the applicable recovery period ends for purposes of reinstating the DNAR order.   10. Patients having a sterilization procedure: I understand that if the procedure is successful the results will be permanent and it will therefo _______________________________________________________________ _____________________________  [facility] lynne Physician)                                                                                         (Date)                                   (Ti